# Patient Record
Sex: MALE | Race: WHITE | NOT HISPANIC OR LATINO | Employment: UNEMPLOYED | URBAN - METROPOLITAN AREA
[De-identification: names, ages, dates, MRNs, and addresses within clinical notes are randomized per-mention and may not be internally consistent; named-entity substitution may affect disease eponyms.]

---

## 2017-07-28 ENCOUNTER — OFFICE VISIT (OUTPATIENT)
Dept: FAMILY MEDICINE | Facility: CLINIC | Age: 22
End: 2017-07-28
Payer: MEDICAID

## 2017-07-28 VITALS
TEMPERATURE: 97 F | SYSTOLIC BLOOD PRESSURE: 104 MMHG | DIASTOLIC BLOOD PRESSURE: 68 MMHG | WEIGHT: 115 LBS | BODY MASS INDEX: 18.05 KG/M2 | HEIGHT: 67 IN

## 2017-07-28 DIAGNOSIS — J02.0 PHARYNGITIS DUE TO STREPTOCOCCUS SPECIES: Primary | ICD-10-CM

## 2017-07-28 LAB
CTP QC/QA: YES
S PYO RRNA THROAT QL PROBE: POSITIVE

## 2017-07-28 PROCEDURE — 99202 OFFICE O/P NEW SF 15 MIN: CPT | Mod: ,,, | Performed by: PEDIATRICS

## 2017-07-28 PROCEDURE — 87880 STREP A ASSAY W/OPTIC: CPT | Mod: ,,, | Performed by: PEDIATRICS

## 2017-07-28 RX ORDER — AMOXICILLIN 500 MG/1
500 CAPSULE ORAL 3 TIMES DAILY
Qty: 30 CAPSULE | Refills: 0 | Status: SHIPPED | OUTPATIENT
Start: 2017-07-28 | End: 2018-09-11

## 2017-07-28 NOTE — PROGRESS NOTES
Subjective:       Patient ID: Carlos Ortiz is a 21 y.o. male.    Chief Complaint: Sore Throat    HPI sore throat for 1 week.  No known fever, poor historian. No cough, vomiting, ear pain.  Review of Systems   All other systems reviewed and are negative.      Objective:      Physical Exam   Constitutional: He is oriented to person, place, and time. He appears well-developed and well-nourished. No distress.   HENT:   Head: Normocephalic.   Right Ear: External ear normal.   Left Ear: External ear normal.   Mouth/Throat: Oropharyngeal exudate present.   Eyes: Conjunctivae and EOM are normal. Pupils are equal, round, and reactive to light. Right eye exhibits no discharge. Left eye exhibits no discharge. No scleral icterus.   Neck: Normal range of motion. Neck supple. No tracheal deviation present. No thyromegaly present.   Cardiovascular: Normal rate, regular rhythm and normal heart sounds.  Exam reveals no gallop and no friction rub.    No murmur heard.  Pulmonary/Chest: Effort normal and breath sounds normal. No stridor. No respiratory distress. He has no wheezes. He has no rales.   Abdominal: Soft. Bowel sounds are normal. He exhibits no distension and no mass. There is no tenderness. There is no rebound and no guarding.   Musculoskeletal: Normal range of motion.   Lymphadenopathy:     He has cervical adenopathy.   Neurological: He is alert and oriented to person, place, and time.   Skin: Skin is warm. Capillary refill takes less than 2 seconds. He is not diaphoretic.   Nursing note and vitals reviewed.      Assessment:       1. Pharyngitis due to Streptococcus species        Plan:       Carlos was seen today for sore throat.    Diagnoses and all orders for this visit:    Pharyngitis due to Streptococcus species  -     POCT Rapid Strep A  -     amoxicillin (AMOXIL) 500 MG capsule; Take 1 capsule (500 mg total) by mouth 3 (three) times daily.

## 2018-09-11 ENCOUNTER — OFFICE VISIT (OUTPATIENT)
Dept: FAMILY MEDICINE | Facility: CLINIC | Age: 23
End: 2018-09-11
Payer: MEDICAID

## 2018-09-11 VITALS
BODY MASS INDEX: 18.36 KG/M2 | HEIGHT: 67 IN | SYSTOLIC BLOOD PRESSURE: 110 MMHG | WEIGHT: 117 LBS | OXYGEN SATURATION: 97 % | HEART RATE: 80 BPM | DIASTOLIC BLOOD PRESSURE: 60 MMHG

## 2018-09-11 DIAGNOSIS — K64.1 GRADE II HEMORRHOIDS: ICD-10-CM

## 2018-09-11 DIAGNOSIS — Z87.898 HX OF SYNCOPE: ICD-10-CM

## 2018-09-11 DIAGNOSIS — R46.89 OPPOSITIONAL DEFIANT BEHAVIOR: ICD-10-CM

## 2018-09-11 DIAGNOSIS — K22.2 ESOPHAGEAL STRICTURE: ICD-10-CM

## 2018-09-11 DIAGNOSIS — Q85.00 NF (NEUROFIBROMATOSIS): ICD-10-CM

## 2018-09-11 PROCEDURE — 99213 OFFICE O/P EST LOW 20 MIN: CPT | Mod: ,,, | Performed by: FAMILY MEDICINE

## 2018-09-11 RX ORDER — HYDROCORTISONE 25 MG/G
CREAM TOPICAL 2 TIMES DAILY
Qty: 60 G | Refills: 3 | Status: SHIPPED | OUTPATIENT
Start: 2018-09-11 | End: 2020-03-11

## 2018-09-11 NOTE — PROGRESS NOTES
Subjective:       Patient ID: Carlos Ortiz is a 23 y.o. male.    Chief Complaint: Hemorrhoids      Patient is here because of a hemorrhoid he is felt a lump in the rectal area and down went to get it checked out. He has not noticed any bleeding, not had constipation or rectal pain on on bowel movement.        Allergies and Medications:   Review of patient's allergies indicates:  No Known Allergies  Current Outpatient Medications   Medication Sig Dispense Refill    hydrocortisone 2.5 % cream Apply topically 2 (two) times daily. for 10 days 60 g 3     No current facility-administered medications for this visit.        Family History:   Family History   Problem Relation Age of Onset    Asthma Mother     Heart disease Father        Social History:   Social History     Socioeconomic History    Marital status: Single     Spouse name: Not on file    Number of children: Not on file    Years of education: Not on file    Highest education level: Not on file   Social Needs    Financial resource strain: Not on file    Food insecurity - worry: Not on file    Food insecurity - inability: Not on file    Transportation needs - medical: Not on file    Transportation needs - non-medical: Not on file   Occupational History    Not on file   Tobacco Use    Smoking status: Never Smoker    Smokeless tobacco: Never Used   Substance and Sexual Activity    Alcohol use: No    Drug use: No    Sexual activity: Not on file   Other Topics Concern    Not on file   Social History Narrative    Not on file       Review of Systems    Objective:     Vitals:    09/11/18 1000   BP: 110/60   Pulse: 80        Physical Exam   Constitutional: He appears well-developed and well-nourished. No distress.   HENT:   Head: Normocephalic and atraumatic.   Genitourinary:         Genitourinary Comments: There is a 1 cm external hemorrhoid at the 5:00 position of the rectum without bleeding or thrombosis.   Skin: He is not diaphoretic.        Assessment:       1. Hx of syncope    2. NF (neurofibromatosis)    3. Esophageal stricture    4. Oppositional defiant behavior    5. Grade II hemorrhoids        Plan:       Carlos was seen today for hemorrhoids.    Diagnoses and all orders for this visit:    Hx of syncope    NF (neurofibromatosis)  -     Ambulatory consult to Neurology  -     Ambulatory referral to Gastroenterology    Esophageal stricture  -     Ambulatory referral to Gastroenterology    Oppositional defiant behavior    Grade II hemorrhoids  -     Ambulatory referral to Gastroenterology    Other orders  -     hydrocortisone 2.5 % cream; Apply topically 2 (two) times daily. for 10 days         Follow-up in about 1 month (around 10/11/2018), or if symptoms worsen or fail to improve.

## 2018-09-11 NOTE — PATIENT INSTRUCTIONS
Metamucil or Citrucel  Increase physical activity no prolonged sitting.  Follow-up L she neurology and gastroenterology

## 2019-08-09 ENCOUNTER — HOSPITAL ENCOUNTER (EMERGENCY)
Facility: HOSPITAL | Age: 24
Discharge: HOME | End: 2019-08-09
Attending: EMERGENCY MEDICINE
Payer: MEDICAID

## 2019-08-09 VITALS
TEMPERATURE: 98 F | OXYGEN SATURATION: 99 % | DIASTOLIC BLOOD PRESSURE: 71 MMHG | HEART RATE: 95 BPM | RESPIRATION RATE: 20 BRPM | SYSTOLIC BLOOD PRESSURE: 120 MMHG

## 2019-08-09 DIAGNOSIS — N50.819 TESTICULAR PAIN: ICD-10-CM

## 2019-08-09 LAB
BILIRUB UR QL STRIP: NEGATIVE
CLARITY UR: CLEAR
COLOR UR: YELLOW
GLUCOSE UR QL STRIP: NEGATIVE
HGB UR QL STRIP: NEGATIVE
KETONES UR QL STRIP: NEGATIVE
LEUKOCYTE ESTERASE UR QL STRIP: NEGATIVE
NITRITE UR QL STRIP: NEGATIVE
PH UR STRIP: 6 [PH] (ref 5–8)
PROT UR QL STRIP: NEGATIVE
SP GR UR STRIP: 1.01 (ref 1–1.03)
URN SPEC COLLECT METH UR: NORMAL
UROBILINOGEN UR STRIP-ACNC: NEGATIVE EU/DL

## 2019-08-09 PROCEDURE — 87491 CHLMYD TRACH DNA AMP PROBE: CPT

## 2019-08-09 PROCEDURE — 81003 URINALYSIS AUTO W/O SCOPE: CPT

## 2019-08-09 PROCEDURE — 96372 THER/PROPH/DIAG INJ SC/IM: CPT | Mod: 59

## 2019-08-09 PROCEDURE — 63600175 PHARM REV CODE 636 W HCPCS: Performed by: EMERGENCY MEDICINE

## 2019-08-09 PROCEDURE — 99284 EMERGENCY DEPT VISIT MOD MDM: CPT

## 2019-08-09 RX ORDER — CEFTRIAXONE 250 MG/1
250 INJECTION, POWDER, FOR SOLUTION INTRAMUSCULAR; INTRAVENOUS
Status: COMPLETED | OUTPATIENT
Start: 2019-08-09 | End: 2019-08-09

## 2019-08-09 RX ORDER — KETOROLAC TROMETHAMINE 30 MG/ML
30 INJECTION, SOLUTION INTRAMUSCULAR; INTRAVENOUS
Status: COMPLETED | OUTPATIENT
Start: 2019-08-09 | End: 2019-08-09

## 2019-08-09 RX ORDER — DOXYCYCLINE 100 MG/1
100 CAPSULE ORAL 2 TIMES DAILY
Qty: 20 CAPSULE | Refills: 0 | Status: SHIPPED | OUTPATIENT
Start: 2019-08-09 | End: 2019-08-19

## 2019-08-09 RX ADMIN — CEFTRIAXONE SODIUM 250 MG: 250 INJECTION, POWDER, FOR SOLUTION INTRAMUSCULAR; INTRAVENOUS at 06:08

## 2019-08-09 RX ADMIN — KETOROLAC TROMETHAMINE 30 MG: 30 INJECTION, SOLUTION INTRAMUSCULAR at 06:08

## 2019-08-09 NOTE — ED PROVIDER NOTES
Encounter Date: 8/9/2019       History     Chief Complaint   Patient presents with    Testicle Pain     x 2 days     Patient reports approximately 2 day history of left scrotal any swelling and soreness.  No clearance exacerbating or relieving factors.  Pain began while at rest.  Patient has no urinary symptoms.  No dysuria or urethral discharge. No recent illness.  No fever or chills. No similar symptoms in the past.  Patient reports no recent sexual activity.        Review of patient's allergies indicates:  No Known Allergies  Past Medical History:   Diagnosis Date    NF (neurofibromatosis) 1997    Otitis media in child      Past Surgical History:   Procedure Laterality Date    FRACTURE ARM  Bilateral      Family History   Problem Relation Age of Onset    Asthma Mother     Heart disease Father      Social History     Tobacco Use    Smoking status: Never Smoker    Smokeless tobacco: Never Used   Substance Use Topics    Alcohol use: No    Drug use: No     Review of Systems   Constitutional: Negative for chills and fever.   HENT: Negative for sore throat.    Eyes: Negative for photophobia and visual disturbance.   Respiratory: Negative for shortness of breath.    Cardiovascular: Negative for chest pain.   Gastrointestinal: Negative for abdominal pain and vomiting.   Genitourinary: Negative for discharge, dysuria, genital sores and hematuria.   Musculoskeletal: Negative for joint swelling.   Skin: Negative for rash.   Neurological: Negative for weakness and headaches.   Psychiatric/Behavioral: Negative for confusion.       Physical Exam     Initial Vitals   BP Pulse Resp Temp SpO2   08/09/19 1653 08/09/19 1652 08/09/19 1652 08/09/19 1652 08/09/19 1652   116/69 101 16 97.9 °F (36.6 °C) 98 %      MAP       --                Physical Exam    Nursing note and vitals reviewed.  Constitutional: He is not diaphoretic. No distress.   HENT:   Head: Normocephalic and atraumatic.   Eyes: Conjunctivae are normal.    Neck: Normal range of motion.   Cardiovascular: Regular rhythm.   Pulmonary/Chest: Breath sounds normal.   Abdominal: Soft. There is no tenderness.   Genitourinary:   Genitourinary Comments: No inguinal lymphadenopathy or hernias.  No penile lesions. No urethral discharge. No scrotal redness.  There is some tenderness to posterior left scrotal area with no palpable masses.   Musculoskeletal: Normal range of motion.   Skin: No rash noted.   Psychiatric: He has a normal mood and affect.         ED Course   Procedures  Labs Reviewed   C. TRACHOMATIS/N. GONORRHOEAE BY AMP DNA   URINALYSIS, REFLEX TO URINE CULTURE          Imaging Results          US Scrotum And Testicles (Final result)  Result time 08/09/19 17:39:03    Final result by Sergio Gay MD (08/09/19 17:39:03)                 Impression:      1. Simple cyst or spermatocele in the right epididymal head.  2. Otherwise negative testicular ultrasound.      Electronically signed by: Sergio Gay MD  Date:    08/09/2019  Time:    17:39             Narrative:    EXAMINATION:  US SCROTUM AND TESTICLES    CLINICAL HISTORY:  Testicular pain, unspecified N50.819    FINDINGS:  Grayscale, color and spectral Doppler analysis of the testicles and scrotum was performed. No prior studies for comparison.    Both testicles are normal in size and fairly homogeneous in echotexture, with no discrete intratesticular masses demonstrated. There is normal, symmetric color and spectral Doppler vascular flow documented to both testicles.    The epididymides are normal in size and echotexture, with normal color Doppler vascular flow.  Circumscribed anechoic simple cyst or spermatocele in the right epididymal head measures 4 mm, with increased through transmission of sound.  There is no hydrocele or varicocele demonstrated. There is no scrotal wall skin thickening or subcutaneous fluid collection.                                 Medical Decision Making:   History:   Old Medical  Records: I decided to obtain old medical records.  Clinical Tests:   Lab Tests: Reviewed  Radiological Study: Reviewed  ED Management:  Patient presents with some left posterior scrotal pain.  Very unclear etiology.  Ultrasound with no definite epididymitis.  No current torsion.  Clinically symptoms are more consistent with epididymitis.  Urinalysis with no significant white blood cells.  Will check gonorrhea and chlamydia.  Will treat for possible sexually transmitted epididymitis with Rocephin and doxycycline.  Discussed with Dr. Tinajero.  Discussed possibility of intermittent torsion.  He states he will follow up closely.  Will begin treatment for presumptive epididymitis.  Strict return precautions given.  Currently patient is essentially pain-free.                      Clinical Impression:       ICD-10-CM ICD-9-CM   1. Testicular pain N50.819 608.9                                Ramesh Escoto MD  08/09/19 1908

## 2019-08-13 LAB
CHLAMYDIA, AMPLIFIED DNA: NEGATIVE
N GONORRHOEAE, AMPLIFIED DNA: NEGATIVE

## 2019-08-17 ENCOUNTER — HOSPITAL ENCOUNTER (EMERGENCY)
Facility: HOSPITAL | Age: 24
Discharge: HOME | End: 2019-08-17
Attending: EMERGENCY MEDICINE
Payer: MEDICAID

## 2019-08-17 VITALS
DIASTOLIC BLOOD PRESSURE: 57 MMHG | HEIGHT: 69 IN | TEMPERATURE: 98 F | WEIGHT: 120 LBS | HEART RATE: 79 BPM | OXYGEN SATURATION: 100 % | RESPIRATION RATE: 76 BRPM | BODY MASS INDEX: 17.77 KG/M2 | SYSTOLIC BLOOD PRESSURE: 117 MMHG

## 2019-08-17 DIAGNOSIS — R11.10 VOMITING, INTRACTABILITY OF VOMITING NOT SPECIFIED, PRESENCE OF NAUSEA NOT SPECIFIED, UNSPECIFIED VOMITING TYPE: Primary | ICD-10-CM

## 2019-08-17 DIAGNOSIS — R55 SYNCOPE: ICD-10-CM

## 2019-08-17 DIAGNOSIS — R05.9 COUGH: ICD-10-CM

## 2019-08-17 LAB
ALBUMIN SERPL BCP-MCNC: 4.1 G/DL (ref 3.5–5.2)
ALP SERPL-CCNC: 58 U/L (ref 55–135)
ALT SERPL W/O P-5'-P-CCNC: 20 U/L (ref 10–44)
AMPHET+METHAMPHET UR QL: NEGATIVE
AMYLASE SERPL-CCNC: 49 U/L (ref 20–110)
ANION GAP SERPL CALC-SCNC: 9 MMOL/L (ref 8–16)
AST SERPL-CCNC: 17 U/L (ref 10–40)
BARBITURATES UR QL SCN>200 NG/ML: NORMAL
BASOPHILS # BLD AUTO: 0.05 K/UL (ref 0–0.2)
BASOPHILS NFR BLD: 0.7 % (ref 0–1.9)
BENZODIAZ UR QL SCN>200 NG/ML: NEGATIVE
BILIRUB SERPL-MCNC: 0.7 MG/DL (ref 0.1–1)
BILIRUB UR QL STRIP: NEGATIVE
BUN SERPL-MCNC: 13 MG/DL (ref 6–20)
BZE UR QL SCN: NEGATIVE
CALCIUM SERPL-MCNC: 9.7 MG/DL (ref 8.7–10.5)
CANNABINOIDS UR QL SCN: NEGATIVE
CHLORIDE SERPL-SCNC: 106 MMOL/L (ref 95–110)
CK SERPL-CCNC: 30 U/L (ref 20–200)
CLARITY UR: CLEAR
CO2 SERPL-SCNC: 27 MMOL/L (ref 23–29)
COLOR UR: YELLOW
CREAT SERPL-MCNC: 0.9 MG/DL (ref 0.5–1.4)
CREAT UR-MCNC: 91 MG/DL (ref 23–375)
D DIMER PPP IA.FEU-MCNC: <0.27 UG/ML FEU
DIFFERENTIAL METHOD: ABNORMAL
EOSINOPHIL # BLD AUTO: 0.1 K/UL (ref 0–0.5)
EOSINOPHIL NFR BLD: 1.2 % (ref 0–8)
ERYTHROCYTE [DISTWIDTH] IN BLOOD BY AUTOMATED COUNT: 12.6 % (ref 11.5–14.5)
EST. GFR  (AFRICAN AMERICAN): >60 ML/MIN/1.73 M^2
EST. GFR  (NON AFRICAN AMERICAN): >60 ML/MIN/1.73 M^2
GLUCOSE SERPL-MCNC: 98 MG/DL (ref 70–110)
GLUCOSE UR QL STRIP: NEGATIVE
HCT VFR BLD AUTO: 46.2 % (ref 40–54)
HGB BLD-MCNC: 15.2 G/DL (ref 14–18)
HGB UR QL STRIP: NEGATIVE
IMM GRANULOCYTES # BLD AUTO: 0.02 K/UL (ref 0–0.04)
IMM GRANULOCYTES NFR BLD AUTO: 0.3 % (ref 0–0.5)
KETONES UR QL STRIP: NEGATIVE
LEUKOCYTE ESTERASE UR QL STRIP: NEGATIVE
LIPASE SERPL-CCNC: 25 U/L (ref 4–60)
LYMPHOCYTES # BLD AUTO: 2.4 K/UL (ref 1–4.8)
LYMPHOCYTES NFR BLD: 32.3 % (ref 18–48)
MCH RBC QN AUTO: 26.6 PG (ref 27–31)
MCHC RBC AUTO-ENTMCNC: 32.9 G/DL (ref 32–36)
MCV RBC AUTO: 81 FL (ref 82–98)
MONOCYTES # BLD AUTO: 0.9 K/UL (ref 0.3–1)
MONOCYTES NFR BLD: 12.9 % (ref 4–15)
NEUTROPHILS # BLD AUTO: 3.8 K/UL (ref 1.8–7.7)
NEUTROPHILS NFR BLD: 52.6 % (ref 38–73)
NITRITE UR QL STRIP: NEGATIVE
NRBC BLD-RTO: 0 /100 WBC
OPIATES UR QL SCN: NEGATIVE
PCP UR QL SCN>25 NG/ML: NEGATIVE
PH UR STRIP: 6 [PH] (ref 5–8)
PLATELET # BLD AUTO: 218 K/UL (ref 150–350)
PMV BLD AUTO: 10.8 FL (ref 9.2–12.9)
POTASSIUM SERPL-SCNC: 3.7 MMOL/L (ref 3.5–5.1)
PROT SERPL-MCNC: 6.9 G/DL (ref 6–8.4)
PROT UR QL STRIP: NEGATIVE
RBC # BLD AUTO: 5.71 M/UL (ref 4.6–6.2)
SODIUM SERPL-SCNC: 142 MMOL/L (ref 136–145)
SP GR UR STRIP: 1 (ref 1–1.03)
TOXICOLOGY INFORMATION: NORMAL
TROPONIN I SERPL DL<=0.01 NG/ML-MCNC: <0.03 NG/ML (ref 0.02–0.04)
URN SPEC COLLECT METH UR: NORMAL
UROBILINOGEN UR STRIP-ACNC: NEGATIVE EU/DL
WBC # BLD AUTO: 7.27 K/UL (ref 3.9–12.7)

## 2019-08-17 PROCEDURE — 80053 COMPREHEN METABOLIC PANEL: CPT

## 2019-08-17 PROCEDURE — 82550 ASSAY OF CK (CPK): CPT

## 2019-08-17 PROCEDURE — 63600175 PHARM REV CODE 636 W HCPCS: Performed by: NURSE PRACTITIONER

## 2019-08-17 PROCEDURE — 93005 ELECTROCARDIOGRAM TRACING: CPT

## 2019-08-17 PROCEDURE — 85379 FIBRIN DEGRADATION QUANT: CPT

## 2019-08-17 PROCEDURE — 63600175 PHARM REV CODE 636 W HCPCS: Performed by: EMERGENCY MEDICINE

## 2019-08-17 PROCEDURE — 82150 ASSAY OF AMYLASE: CPT

## 2019-08-17 PROCEDURE — 81003 URINALYSIS AUTO W/O SCOPE: CPT

## 2019-08-17 PROCEDURE — 99285 EMERGENCY DEPT VISIT HI MDM: CPT | Mod: 25

## 2019-08-17 PROCEDURE — 96361 HYDRATE IV INFUSION ADD-ON: CPT

## 2019-08-17 PROCEDURE — 96374 THER/PROPH/DIAG INJ IV PUSH: CPT

## 2019-08-17 PROCEDURE — 83690 ASSAY OF LIPASE: CPT

## 2019-08-17 PROCEDURE — 87086 URINE CULTURE/COLONY COUNT: CPT

## 2019-08-17 PROCEDURE — 36415 COLL VENOUS BLD VENIPUNCTURE: CPT

## 2019-08-17 PROCEDURE — 85025 COMPLETE CBC W/AUTO DIFF WBC: CPT

## 2019-08-17 PROCEDURE — 84484 ASSAY OF TROPONIN QUANT: CPT

## 2019-08-17 PROCEDURE — 80307 DRUG TEST PRSMV CHEM ANLYZR: CPT

## 2019-08-17 RX ORDER — ONDANSETRON 4 MG/1
4 TABLET, ORALLY DISINTEGRATING ORAL EVERY 6 HOURS PRN
Qty: 12 TABLET | Refills: 0 | Status: SHIPPED | OUTPATIENT
Start: 2019-08-17 | End: 2020-03-11

## 2019-08-17 RX ORDER — ONDANSETRON 2 MG/ML
4 INJECTION INTRAMUSCULAR; INTRAVENOUS
Status: COMPLETED | OUTPATIENT
Start: 2019-08-17 | End: 2019-08-17

## 2019-08-17 RX ADMIN — SODIUM CHLORIDE 1000 ML: 0.9 INJECTION, SOLUTION INTRAVENOUS at 09:08

## 2019-08-17 RX ADMIN — ONDANSETRON 4 MG: 2 INJECTION INTRAMUSCULAR; INTRAVENOUS at 07:08

## 2019-08-17 RX ADMIN — SODIUM CHLORIDE 1000 ML: 0.9 INJECTION, SOLUTION INTRAVENOUS at 07:08

## 2019-08-17 NOTE — ED PROVIDER NOTES
"Encounter Date: 8/17/2019       History     Chief Complaint   Patient presents with    Emesis     recently here, states "blacking out"     23 year old male presents complaining of nausea, vomiting and weakness. Symptoms onset 1 week ago after he started taking Doxycycline. He was seen here at that time for testicular pain. He denies fever or chills. He does endorse weakness and near syncope associated with vomiting. No recent sick contacts or travel. Symptoms are worse after taking Doxycycline. No alleviating factors. He also endorses nonproductive cough and mild wheezing.        Review of patient's allergies indicates:  No Known Allergies  Past Medical History:   Diagnosis Date    NF (neurofibromatosis) 1997    Otitis media in child      Past Surgical History:   Procedure Laterality Date    FRACTURE ARM  Bilateral      Family History   Problem Relation Age of Onset    Asthma Mother     Heart disease Father      Social History     Tobacco Use    Smoking status: Never Smoker    Smokeless tobacco: Never Used   Substance Use Topics    Alcohol use: No    Drug use: No     Review of Systems   Constitutional: Negative.    HENT: Negative.    Eyes: Negative.    Respiratory: Negative.    Cardiovascular: Negative.    Gastrointestinal: Positive for nausea and vomiting. Negative for abdominal pain.   Musculoskeletal: Negative.    Skin: Negative.    Psychiatric/Behavioral: Negative.        Physical Exam     Initial Vitals [08/17/19 1749]   BP Pulse Resp Temp SpO2   (!) 127/58 91 16 97.8 °F (36.6 °C) 98 %      MAP       --         Physical Exam    Nursing note and vitals reviewed.  Constitutional: He appears well-developed and well-nourished. He is not diaphoretic. No distress.   HENT:   Head: Normocephalic.   Right Ear: External ear normal.   Left Ear: External ear normal.   Mouth/Throat: Oropharynx is clear and moist. No oropharyngeal exudate.   Eyes: Conjunctivae are normal. Pupils are equal, round, and reactive to " light.   Neck: Normal range of motion. Neck supple.   Cardiovascular: Normal rate, regular rhythm, normal heart sounds and intact distal pulses. Exam reveals no gallop and no friction rub.    No murmur heard.  Pulmonary/Chest: No respiratory distress. He has wheezes. He has no rhonchi. He has no rales. He exhibits no tenderness.   Abdominal: Soft. Bowel sounds are normal. He exhibits no distension and no mass. There is no tenderness. There is no rebound and no guarding.   Musculoskeletal: Normal range of motion. He exhibits no edema or tenderness.   Lymphadenopathy:     He has no cervical adenopathy.   Neurological: He is alert and oriented to person, place, and time. He has normal strength. No cranial nerve deficit. GCS score is 15. GCS eye subscore is 4. GCS verbal subscore is 5. GCS motor subscore is 6.   Skin: Skin is warm and dry. Capillary refill takes less than 2 seconds. No rash and no abscess noted. No erythema. No pallor.   Psychiatric: He has a normal mood and affect. Thought content normal.         ED Course   Procedures  Labs Reviewed   CBC W/ AUTO DIFFERENTIAL   COMPREHENSIVE METABOLIC PANEL   LIPASE   URINALYSIS, REFLEX TO URINE CULTURE   AMYLASE          Imaging Results    None            Additional MDM:   Abdominal Pain: Medication(s) given for nausea: Zofran. Response to nausea medication: resolved.   Re-evaluation of symptoms: Improved. Disposition: Discharged. Vitals stable. He is does not appear to be acutely ill or toxic in appearance. Labs stable. Abdominal exam is benign. IV fluids given. IV zofran given. CT head negative. He is tolerating po at time of discharge. Rx: Zofran. Recommend D/C doxycycline.Recommend primary care follow up. Return precautions discussed.                    Clinical Impression:   Nausea and Vomiting                             Karan Feng NP  08/17/19 6541

## 2019-08-20 LAB
BACTERIA UR CULT: NO GROWTH
BARBITURATES UR QL SCN: NEGATIVE NG/ML
LABORATORY COMMENT REPORT: NORMAL

## 2020-03-11 ENCOUNTER — OFFICE VISIT (OUTPATIENT)
Dept: FAMILY MEDICINE | Facility: CLINIC | Age: 25
End: 2020-03-11
Payer: MEDICAID

## 2020-03-11 VITALS
WEIGHT: 128.88 LBS | TEMPERATURE: 98 F | OXYGEN SATURATION: 98 % | HEIGHT: 69 IN | SYSTOLIC BLOOD PRESSURE: 112 MMHG | DIASTOLIC BLOOD PRESSURE: 80 MMHG | BODY MASS INDEX: 19.09 KG/M2 | HEART RATE: 84 BPM

## 2020-03-11 DIAGNOSIS — L03.011 PARONYCHIA OF THUMB, RIGHT: ICD-10-CM

## 2020-03-11 DIAGNOSIS — Z00.00 PREVENTATIVE HEALTH CARE: Primary | ICD-10-CM

## 2020-03-11 PROCEDURE — 99213 OFFICE O/P EST LOW 20 MIN: CPT | Mod: S$PBB,,, | Performed by: FAMILY MEDICINE

## 2020-03-11 PROCEDURE — 99213 PR OFFICE/OUTPT VISIT, EST, LEVL III, 20-29 MIN: ICD-10-PCS | Mod: S$PBB,,, | Performed by: FAMILY MEDICINE

## 2020-03-11 PROCEDURE — 99213 OFFICE O/P EST LOW 20 MIN: CPT | Performed by: FAMILY MEDICINE

## 2020-03-11 RX ORDER — AMOXICILLIN AND CLAVULANATE POTASSIUM 875; 125 MG/1; MG/1
1 TABLET, FILM COATED ORAL 2 TIMES DAILY
Qty: 20 TABLET | Refills: 0 | Status: SHIPPED | OUTPATIENT
Start: 2020-03-11 | End: 2022-09-01

## 2020-03-11 NOTE — PATIENT INSTRUCTIONS
Lactobacillus Oral formulations  What is this medicine?  LACTOBACILLUS (keny GRAY uhs) is a supplement. It is used to help the normal balance of bacteria in the colon. This may treat or prevent diarrhea caused by an infection or by antibiotics. The FDA has not approved this supplement for any medical use.  This supplement may be used for other purposes; ask your health care provider or pharmacist if you have questions.  How should I use this medicine?  Take this medicine by mouth with a small amount of milk, fruit juice, or water. Follow the directions on the package labeling, or take as directed by your health care professional. This medicine can be taken with cereal or other food. Do not take this medicine more often than directed.  Contact your pediatrician regarding the use of this medicine in children. Special care may be needed. This medicine is not recommended for children under 3 years old unless prescribed by a doctor.  What side effects may I notice from receiving this medicine?  Side effects that you should report to your doctor or health care professional as soon as possible:  · allergic reactions like skin rash, itching or hives, swelling of the face, lips, or tongue  · breathing problems  · severe nausea, vomiting  · unusually weak or tired  Side effects that usually do not require medical attention (report to your doctor or health care professional if they continue or are bothersome):  · hiccups  · stomach gas  What may interact with this medicine?  Interactions are not expected.  What if I miss a dose?  If you miss a dose, take it as soon as you can. If it is almost time for your next dose, take only that dose. Do not take double or extra doses.  Where should I keep my medicine?  Keep out of the reach of children.  Store in the refrigerator or as directed on the package label. Do not freeze. Throw away any unused medicine after the expiration date.  What should I tell my health care provider  before I take this medicine?  They need to know if you have any of these conditions:  · chronic disease  · immune system problems  · prosthetic heart valve or valvular heart disease  · an unusual or allergic reaction to Lactobacillus, any medicines, lactose or milk, other foods, dyes, or preservatives  · pregnant or trying to get pregnant  · breast-feeding  What should I watch for while using this medicine?  See your doctor if your symptoms do not get better or if they get worse. Do not take this supplement for more than 2 days or if you have a fever unless your doctor tells you to.  If you have allergies to milk or you are sensitive to lactose, do not use this supplement.  NOTE:This sheet is a summary. It may not cover all possible information. If you have questions about this medicine, talk to your doctor, pharmacist, or health care provider. Copyright© 2017 [employer]

## 2020-03-11 NOTE — PROGRESS NOTES
Subjective:       Patient ID: Carlos Ortiz is a 24 y.o. male.    Chief Complaint: Infection (right thumb red and swollen x 3 days no fever )      Started on Monday with infection in the right thumb.  He does trim the nails close and bites the nails and has developed an ingrown cuticle on that side.    Infection         Allergies and Medications:   Review of patient's allergies indicates:  No Known Allergies  Current Outpatient Medications   Medication Sig Dispense Refill    amoxicillin-clavulanate 875-125mg (AUGMENTIN) 875-125 mg per tablet Take 1 tablet by mouth 2 (two) times daily. 20 tablet 0     No current facility-administered medications for this visit.        Family History:   Family History   Problem Relation Age of Onset    Asthma Mother     Heart disease Father        Social History:   Social History     Socioeconomic History    Marital status: Single     Spouse name: Not on file    Number of children: Not on file    Years of education: Not on file    Highest education level: Not on file   Occupational History    Not on file   Social Needs    Financial resource strain: Not on file    Food insecurity:     Worry: Not on file     Inability: Not on file    Transportation needs:     Medical: Not on file     Non-medical: Not on file   Tobacco Use    Smoking status: Never Smoker    Smokeless tobacco: Never Used   Substance and Sexual Activity    Alcohol use: No    Drug use: No    Sexual activity: Not on file   Lifestyle    Physical activity:     Days per week: Not on file     Minutes per session: Not on file    Stress: Not at all   Relationships    Social connections:     Talks on phone: Not on file     Gets together: Not on file     Attends Worship service: Not on file     Active member of club or organization: Not on file     Attends meetings of clubs or organizations: Not on file     Relationship status: Not on file   Other Topics Concern    Not on file   Social History Narrative     Not on file       Review of Systems    Objective:     Vitals:    03/11/20 1026   BP: 112/80   Pulse: 84   Temp: 98.3 °F (36.8 °C)        Physical Exam   Musculoskeletal:        Hands:      Assessment:       1. Paronychia of thumb, right        Plan:       Carlos was seen today for infection.    Diagnoses and all orders for this visit:    Paronychia of thumb, right  -     amoxicillin-clavulanate 875-125mg (AUGMENTIN) 875-125 mg per tablet; Take 1 tablet by mouth 2 (two) times daily.         Follow up in about 1 week (around 3/18/2020), or if symptoms worsen or fail to improve.

## 2020-03-16 ENCOUNTER — OFFICE VISIT (OUTPATIENT)
Dept: FAMILY MEDICINE | Facility: CLINIC | Age: 25
End: 2020-03-16
Payer: MEDICAID

## 2020-03-16 VITALS
HEART RATE: 88 BPM | TEMPERATURE: 98 F | HEIGHT: 69 IN | WEIGHT: 128.31 LBS | SYSTOLIC BLOOD PRESSURE: 112 MMHG | OXYGEN SATURATION: 98 % | BODY MASS INDEX: 19 KG/M2 | DIASTOLIC BLOOD PRESSURE: 62 MMHG

## 2020-03-16 DIAGNOSIS — L03.011 PARONYCHIA OF THUMB, RIGHT: Primary | ICD-10-CM

## 2020-03-16 PROCEDURE — 99213 OFFICE O/P EST LOW 20 MIN: CPT | Mod: 25 | Performed by: FAMILY MEDICINE

## 2020-03-16 PROCEDURE — 10060 I&D ABSCESS SIMPLE/SINGLE: CPT | Mod: PBBFAC | Performed by: FAMILY MEDICINE

## 2020-03-16 PROCEDURE — 87070 CULTURE OTHR SPECIMN AEROBIC: CPT

## 2020-03-16 PROCEDURE — 10060 INCISION & DRAINAGE: ICD-10-PCS | Mod: S$PBB,,, | Performed by: FAMILY MEDICINE

## 2020-03-16 RX ORDER — HYDROCODONE BITARTRATE AND ACETAMINOPHEN 5; 325 MG/1; MG/1
1 TABLET ORAL EVERY 6 HOURS PRN
Qty: 28 TABLET | Refills: 0 | Status: SHIPPED | OUTPATIENT
Start: 2020-03-16 | End: 2020-03-23

## 2020-03-16 RX ORDER — LEVOFLOXACIN 500 MG/1
500 TABLET, FILM COATED ORAL DAILY
Qty: 10 TABLET | Refills: 0 | Status: SHIPPED | OUTPATIENT
Start: 2020-03-16 | End: 2020-03-26

## 2020-03-16 NOTE — PROGRESS NOTES
Subjective:       Patient ID: Carlos Ortiz is a 24 y.o. male.    Chief Complaint: Consult (issues with thumb not improving)      Patient is here for follow-up on infected thumb he had a low-grade paronychial infection on last visit Wednesday he has taken Augmentin every day since then and the infection has gotten worse has developed a large abscess and under the paronychial area.  No fever.  He has had no swollen proximal olecranon on or axillary nodes.      Allergies and Medications:   Review of patient's allergies indicates:  No Known Allergies  Current Outpatient Medications   Medication Sig Dispense Refill    amoxicillin-clavulanate 875-125mg (AUGMENTIN) 875-125 mg per tablet Take 1 tablet by mouth 2 (two) times daily. 20 tablet 0     No current facility-administered medications for this visit.        Family History:   Family History   Problem Relation Age of Onset    Asthma Mother     Heart disease Father        Social History:   Social History     Socioeconomic History    Marital status: Single     Spouse name: Not on file    Number of children: Not on file    Years of education: Not on file    Highest education level: Not on file   Occupational History    Not on file   Social Needs    Financial resource strain: Not on file    Food insecurity:     Worry: Not on file     Inability: Not on file    Transportation needs:     Medical: Not on file     Non-medical: Not on file   Tobacco Use    Smoking status: Never Smoker    Smokeless tobacco: Never Used   Substance and Sexual Activity    Alcohol use: No    Drug use: No    Sexual activity: Not on file   Lifestyle    Physical activity:     Days per week: Not on file     Minutes per session: Not on file    Stress: Not at all   Relationships    Social connections:     Talks on phone: Not on file     Gets together: Not on file     Attends Judaism service: Not on file     Active member of club or organization: Not on file     Attends meetings of  clubs or organizations: Not on file     Relationship status: Not on file   Other Topics Concern    Not on file   Social History Narrative    Not on file       Review of Systems    Objective:     Vitals:    03/16/20 1505   BP: 112/62   Pulse: 88   Temp: 98.1 °F (36.7 °C)        Physical Exam   Constitutional: He appears well-developed and well-nourished.   HENT:   Head: Normocephalic.   Musculoskeletal:        Hands:  Nursing note and vitals reviewed.      Assessment:       1. Paronychia of thumb, right        Plan:       Carlos was seen today for consult.    Diagnoses and all orders for this visit:    Paronychia of thumb, right         Follow up in about 2 weeks (around 3/30/2020).

## 2020-03-16 NOTE — PROCEDURES
"Incision & Drainage  Date/Time: 3/16/2020 3:00 PM  Performed by: Lamont Bains MD  Authorized by: Lamont Bains MD     Time out: Immediately prior to procedure a "time out" was called to verify the correct patient, procedure, equipment, support staff and site/side marked as required.    Consent Done?:  Yes (Written)    Type:  Abscess  Body area:  Upper extremity  Location details:  Right hand  Anesthesia:  Digital block  Local anesthetic: lidocaine 2% without epinephrine  Scalpel size:  11  Incision type:  Single straight  Complexity:  Simple  Drainage:  Pus, bloody, purulent and serous  Drainage amount:  Copious  Wound treatment:  Wound left open    Dressed with a 2 x 2 Neosporin ointment and toe gauze.      "

## 2020-03-20 LAB — BACTERIA SPEC AEROBE CULT: ABNORMAL

## 2020-04-02 ENCOUNTER — OFFICE VISIT (OUTPATIENT)
Dept: FAMILY MEDICINE | Facility: CLINIC | Age: 25
End: 2020-04-02
Payer: MEDICAID

## 2020-04-02 DIAGNOSIS — L03.011 PARONYCHIA OF THUMB, RIGHT: Primary | ICD-10-CM

## 2020-04-02 DIAGNOSIS — Z23 IMMUNIZATION DUE: ICD-10-CM

## 2020-04-02 PROCEDURE — 99213 OFFICE O/P EST LOW 20 MIN: CPT | Mod: 95,,, | Performed by: FAMILY MEDICINE

## 2020-04-02 PROCEDURE — 99213 PR OFFICE/OUTPT VISIT, EST, LEVL III, 20-29 MIN: ICD-10-PCS | Mod: 95,,, | Performed by: FAMILY MEDICINE

## 2020-04-02 NOTE — PROGRESS NOTES
Subjective:      M*Modal was used in creation of this note and may include some Misspelling and misinterpretations.    The chief complaint leading to consultation is:  Follow-up paronychia L abscess  The patient location is:  Home  Visit type: Virtual visit with synchronous audio and video    Patient is here infection.  Patient was seen here with the paronychial infection subsequently developed an abscess and was drained 2 weeks ago is doing a virtual visit for follow-up.  He reports that the wound healed up well is still pink and slightly tender but there is no numbness or pain as it was before patient did complete the course of antibiotics.                 Past Surgical History:   Procedure Laterality Date    FRACTURE ARM  Bilateral      Past Medical History:   Diagnosis Date    NF (neurofibromatosis) 1997    Otitis media in child      Family History   Problem Relation Age of Onset    Asthma Mother     Heart disease Father         Social History:   Marital Status: Single  Alcohol History:  reports that he does not drink alcohol.  Tobacco History:  reports that he has never smoked. He has never used smokeless tobacco.  Drug History:  reports that he does not use drugs.    Review of patient's allergies indicates:  No Known Allergies    Current Outpatient Medications   Medication Sig Dispense Refill    amoxicillin-clavulanate 875-125mg (AUGMENTIN) 875-125 mg per tablet Take 1 tablet by mouth 2 (two) times daily. 20 tablet 0     No current facility-administered medications for this visit.        Review of Systems      Objective:        Physical Exam:   Physical Exam   Musculoskeletal:        Hands:           Assessment:       1. Paronychia of thumb, right    2. Immunization due tetanus and influenza vaccine be deferred into the fall because of the COVID-19 situation.     Plan:   Paronychia of thumb, right    Immunization due      Follow up in about 3 months (around 7/2/2020) for annual.    Total time spent  with patient: 15'    Each patient to whom he or she provides medical services by telemedicine is:  (1) informed of the relationship between the physician and patient and the respective role of any other health care provider with respect to management of the patient; and (2) notified that he or she may decline to receive medical services by telemedicine and may withdraw from such care at any time.    This note was created using [employer] voice recognition software that occasionally misinterprets phrases or words.

## 2020-04-06 ENCOUNTER — TELEPHONE (OUTPATIENT)
Dept: FAMILY MEDICINE | Facility: CLINIC | Age: 25
End: 2020-04-06

## 2021-06-01 ENCOUNTER — TELEPHONE (OUTPATIENT)
Dept: FAMILY MEDICINE | Facility: CLINIC | Age: 26
End: 2021-06-01

## 2022-07-12 ENCOUNTER — OFFICE VISIT (OUTPATIENT)
Dept: FAMILY MEDICINE | Facility: CLINIC | Age: 27
End: 2022-07-12
Payer: MEDICAID

## 2022-07-12 VITALS
DIASTOLIC BLOOD PRESSURE: 74 MMHG | OXYGEN SATURATION: 99 % | SYSTOLIC BLOOD PRESSURE: 108 MMHG | HEART RATE: 64 BPM | HEIGHT: 69 IN | BODY MASS INDEX: 21.77 KG/M2 | WEIGHT: 147 LBS | TEMPERATURE: 98 F

## 2022-07-12 DIAGNOSIS — Q85.00: ICD-10-CM

## 2022-07-12 DIAGNOSIS — R55 RECURRENT SYNCOPE: Primary | ICD-10-CM

## 2022-07-12 DIAGNOSIS — R00.0 TACHYCARDIA: ICD-10-CM

## 2022-07-12 PROCEDURE — 99214 OFFICE O/P EST MOD 30 MIN: CPT | Performed by: NURSE PRACTITIONER

## 2022-07-12 PROCEDURE — 3074F SYST BP LT 130 MM HG: CPT | Mod: CPTII,,, | Performed by: NURSE PRACTITIONER

## 2022-07-12 PROCEDURE — 99214 OFFICE O/P EST MOD 30 MIN: CPT | Mod: S$PBB,,, | Performed by: NURSE PRACTITIONER

## 2022-07-12 PROCEDURE — 99214 PR OFFICE/OUTPT VISIT, EST, LEVL IV, 30-39 MIN: ICD-10-PCS | Mod: S$PBB,,, | Performed by: NURSE PRACTITIONER

## 2022-07-12 PROCEDURE — 3074F PR MOST RECENT SYSTOLIC BLOOD PRESSURE < 130 MM HG: ICD-10-PCS | Mod: CPTII,,, | Performed by: NURSE PRACTITIONER

## 2022-07-12 PROCEDURE — 3008F PR BODY MASS INDEX (BMI) DOCUMENTED: ICD-10-PCS | Mod: CPTII,,, | Performed by: NURSE PRACTITIONER

## 2022-07-12 PROCEDURE — 1160F RVW MEDS BY RX/DR IN RCRD: CPT | Mod: CPTII,,, | Performed by: NURSE PRACTITIONER

## 2022-07-12 PROCEDURE — 1159F PR MEDICATION LIST DOCUMENTED IN MEDICAL RECORD: ICD-10-PCS | Mod: CPTII,,, | Performed by: NURSE PRACTITIONER

## 2022-07-12 PROCEDURE — 3078F PR MOST RECENT DIASTOLIC BLOOD PRESSURE < 80 MM HG: ICD-10-PCS | Mod: CPTII,,, | Performed by: NURSE PRACTITIONER

## 2022-07-12 PROCEDURE — 1159F MED LIST DOCD IN RCRD: CPT | Mod: CPTII,,, | Performed by: NURSE PRACTITIONER

## 2022-07-12 PROCEDURE — 1160F PR REVIEW ALL MEDS BY PRESCRIBER/CLIN PHARMACIST DOCUMENTED: ICD-10-PCS | Mod: CPTII,,, | Performed by: NURSE PRACTITIONER

## 2022-07-12 PROCEDURE — 3008F BODY MASS INDEX DOCD: CPT | Mod: CPTII,,, | Performed by: NURSE PRACTITIONER

## 2022-07-12 PROCEDURE — 3078F DIAST BP <80 MM HG: CPT | Mod: CPTII,,, | Performed by: NURSE PRACTITIONER

## 2022-07-12 NOTE — PROGRESS NOTES
SUBJECTIVE:      Patient ID: Carlos Ortiz is a 26 y.o. male.    Chief Complaint: Loss of Consciousness (Patient states that he has a history of Syncope but it hasn't happened in 4 years until 7/7/22. Patient states that he was at work at [employer] and [employer] like he was going to pass out so he slowly ease himself down to the floor. Patient states that he was out for approximately 5 min per his co-workers. Patient hasn't had another episode since 7/7/22.)    26-year-old male presents to the clinic with complaints of syncope.  PMH significant for neurofibromatosis in 1997 and previous syncopal episodes. He is no longer followed by neurology, stopped at the age of 21.  He works at [employer].  The syncopal episode occurred Thursday at work while moving boxes. He felt his heart race prior to passing out. The event was witnessed by others. Denies injury or trauma.  This was the first syncopal episode in 4 years.  Denies hx of anemia and diabetes. No seizure activity. There have been no sudden or early deaths in the family. His dad does have heart disease with a hx of MI x4.  Denies drug use or recent illnesses.     Loss of Consciousness  This is a recurrent problem. The current episode started in the past 7 days. Episode frequency: once. The problem has been resolved. He lost consciousness for a period of 1 to 5 minutes. Exacerbated by: Lifting boxes. Associated symptoms include light-headedness and palpitations. Pertinent negatives include no abdominal pain, auditory change, aura, back pain, bladder incontinence, bowel incontinence, chest pain, clumsiness, confusion, diaphoresis, dizziness, fever, focal sensory loss, focal weakness, headaches, malaise/fatigue, nausea, slurred speech, vertigo, visual change, vomiting or weakness. He has tried nothing for the symptoms. There is no history of arrhythmia, a clotting disorder, DM or a sudden death in family.       Family History   Problem Relation Age of Onset     Asthma Mother     Heart disease Father       Social History     Socioeconomic History    Marital status: Single   Tobacco Use    Smoking status: Never Smoker    Smokeless tobacco: Never Used   Substance and Sexual Activity    Alcohol use: No    Drug use: No     Current Outpatient Medications   Medication Sig Dispense Refill    amoxicillin-clavulanate 875-125mg (AUGMENTIN) 875-125 mg per tablet Take 1 tablet by mouth 2 (two) times daily. (Patient not taking: Reported on 7/12/2022) 20 tablet 0     No current facility-administered medications for this visit.     Review of patient's allergies indicates:  No Known Allergies   Past Medical History:   Diagnosis Date    NF (neurofibromatosis) 1997    Otitis media in child      Past Surgical History:   Procedure Laterality Date    FRACTURE ARM  Bilateral        Review of Systems   Constitutional: Negative for activity change, appetite change, chills, diaphoresis, fatigue, fever, malaise/fatigue and unexpected weight change.   HENT: Negative for congestion, ear pain, sinus pressure, sore throat, trouble swallowing and voice change.    Eyes: Negative for pain, discharge and visual disturbance.   Respiratory: Negative for cough, chest tightness, shortness of breath and wheezing.    Cardiovascular: Positive for palpitations and syncope. Negative for chest pain.   Gastrointestinal: Negative for abdominal pain, bowel incontinence, constipation, diarrhea, nausea and vomiting.   Genitourinary: Negative for bladder incontinence, difficulty urinating, flank pain, frequency and urgency.   Musculoskeletal: Negative for back pain and joint swelling.   Skin: Negative for color change and rash.   Neurological: Positive for syncope and light-headedness. Negative for dizziness, vertigo, tremors, focal weakness, seizures, facial asymmetry, speech difficulty, weakness, numbness and headaches.   Hematological: Negative for adenopathy.   Psychiatric/Behavioral: Negative for confusion,  "dysphoric mood and sleep disturbance. The patient is not nervous/anxious.       OBJECTIVE:      Vitals:    07/12/22 0912   BP: 108/74   Pulse: 64   Temp: 97.9 °F (36.6 °C)   TempSrc: Oral   SpO2: 99%   Weight: 66.7 kg (147 lb)   Height: 5' 9" (1.753 m)     Physical Exam  Vitals and nursing note reviewed.   Constitutional:       General: He is awake. He is not in acute distress.     Appearance: Normal appearance. He is well-developed and normal weight. He is not ill-appearing, toxic-appearing or diaphoretic.   HENT:      Head: Normocephalic and atraumatic.      Nose: Nose normal.   Eyes:      General: Lids are normal. Gaze aligned appropriately.      Conjunctiva/sclera: Conjunctivae normal.      Right eye: Right conjunctiva is not injected.      Left eye: Left conjunctiva is not injected.      Pupils: Pupils are equal, round, and reactive to light.   Cardiovascular:      Rate and Rhythm: Normal rate and regular rhythm.      Pulses: Normal pulses.      Heart sounds: Normal heart sounds, S1 normal and S2 normal. No murmur heard.    No friction rub. No gallop.   Pulmonary:      Effort: Pulmonary effort is normal. No respiratory distress.      Breath sounds: Normal breath sounds. No stridor. No decreased breath sounds, wheezing, rhonchi or rales.   Chest:      Chest wall: No tenderness.   Musculoskeletal:      Cervical back: Neck supple.      Right lower leg: No edema.      Left lower leg: No edema.   Lymphadenopathy:      Cervical: No cervical adenopathy.   Skin:     General: Skin is warm and dry.      Capillary Refill: Capillary refill takes less than 2 seconds.      Findings: No erythema or rash.   Neurological:      Mental Status: He is alert and oriented to person, place, and time. Mental status is at baseline.      GCS: GCS eye subscore is 4. GCS verbal subscore is 5. GCS motor subscore is 6.      Cranial Nerves: Cranial nerves 2-12 are intact.      Sensory: Sensation is intact.      Motor: Motor function is " intact. No weakness, tremor, abnormal muscle tone or pronator drift.      Coordination: Coordination is intact. Romberg sign negative. Finger-Nose-Finger Test and Heel to Shin Test normal.      Gait: Gait is intact.   Psychiatric:         Attention and Perception: Attention normal.         Mood and Affect: Mood normal.         Speech: Speech normal.         Behavior: Behavior normal. Behavior is cooperative.         Thought Content: Thought content normal.         Judgment: Judgment normal.        Assessment:       1. Recurrent syncope    2. Tachycardia    3. History of neurofibromatosis        Plan:       Recurrent syncope  No acute neuro deficits noted.  Heart with regular rate/ rhythm.  This has happened before. Patient reports no apparent cause has been found.  Suspect possible vasovagal syncope from lifting boxes.  He did report tachycardia prior to passing out so will get a Holter monitor.  Labs, EKG and CT of head ordered.  If testing is normal, may need echo. If syncope occurs again patient encouraged to go to the ER.   -     CBC Auto Differential; Future; Expected date: 07/12/2022  -     Comprehensive Metabolic Panel; Future; Expected date: 07/12/2022  -     TSH; Future; Expected date: 07/12/2022  -     Urinalysis; Future; Expected date: 07/12/2022  -     Drug screen panel, emergency  -     EKG 12-lead; Future  -     Holter monitor - 48 hour; Future  -     CT Head Without Contrast; Future; Expected date: 07/12/2022  -     Ambulatory referral/consult to Neurology; Future; Expected date: 07/19/2022    Tachycardia  Regular rate and rhythm today.  Holter monitor was ordered, but suspect it will be normal unless another syncopal episode happens to occur in the 48 hours he is wearing the monitor.   -     TSH; Future; Expected date: 07/12/2022  -     Drug screen panel, emergency  -     EKG 12-lead; Future  -     Holter monitor - 48 hour; Future    History of neurofibromatosis  Noted as an infant.  CT scan in 2019  was normal.  No neurological deficits noted on exam.   -     CT Head Without Contrast; Future; Expected date: 07/12/2022  -     Ambulatory referral/consult to Neurology; Future; Expected date: 07/19/2022    This note was created using [employer] voice recognition software that occasionally misinterprets phrases or words.     Follow up if symptoms worsen or fail to improve.      7/12/2022 FLAKITA Merrill, FNP

## 2022-07-14 DIAGNOSIS — R55 RECURRENT SYNCOPE: Primary | ICD-10-CM

## 2022-07-14 DIAGNOSIS — R00.0 TACHYCARDIA: ICD-10-CM

## 2022-07-22 DIAGNOSIS — G45.9 TRANSIENT ISCHEMIC ATTACK (TIA): ICD-10-CM

## 2022-07-22 DIAGNOSIS — R55 SYNCOPE AND COLLAPSE: Primary | ICD-10-CM

## 2022-07-22 DIAGNOSIS — R56.9 SEIZURES: ICD-10-CM

## 2022-07-22 DIAGNOSIS — Q85.00: ICD-10-CM

## 2022-07-25 ENCOUNTER — TELEPHONE (OUTPATIENT)
Dept: CARDIOLOGY | Facility: CLINIC | Age: 27
End: 2022-07-25
Payer: MEDICAID

## 2022-07-25 NOTE — TELEPHONE ENCOUNTER
----- Message from Jason Glez sent at 7/25/2022  2:33 PM CDT -----  Contact: urvashi  Type:  Sooner Appointment Request  Caller is requesting a sooner appointment.  Caller declined first available appointment listed below.  Caller will not accept being placed on the waitlist and is requesting a message be sent to doctor.  Name of Caller:  Pt mom Urvashi  When is the first available appointment?  consult  Symptoms:  history of heart events   Best Call Back Number:  [phone]  Additional Information:  Pt mom requesting a call back concerning scheduling appt for son.Mom was told pt needs a referral.Pt has Medicaid and was not allowed to schedule appt.

## 2022-07-25 NOTE — TELEPHONE ENCOUNTER
Advised patient mom we do not take medicaid and that Dr. Ham doesn't take new patients. She advised she wanted Dr. Weinberg. Did advise he is no longer in our office only in [place] that she would have to call them and see when available is. Pt mom said she would call over there to determine.

## 2022-07-25 NOTE — TELEPHONE ENCOUNTER
----- Message from Sam Billy sent at 7/25/2022  1:16 PM CDT -----  Type:  Sooner Appointment Request    Caller is requesting a sooner appointment.  Caller declined first available appointment listed below.  Caller will not accept being placed on the waitlist and is requesting a message be sent to doctor.    Name of Caller:  Pts mother  When is the first available appointment?    Symptoms:  Referral from Dr Maxi Chadwick Call Back Number:  [phone]     Additional Information:  St[facility] office was sent the referral.  Please advise -- Thank you

## 2022-07-25 NOTE — TELEPHONE ENCOUNTER
Informed pt mother informed her that we do not have medicaid appointments  available at the time . CCC shannon santos was given . understanding was verbalized

## 2022-08-10 ENCOUNTER — CLINICAL SUPPORT (OUTPATIENT)
Dept: CARDIOLOGY | Facility: HOSPITAL | Age: 27
End: 2022-08-10
Attending: NURSE PRACTITIONER
Payer: MEDICAID

## 2022-08-10 VITALS — WEIGHT: 147 LBS | BODY MASS INDEX: 21.77 KG/M2 | HEIGHT: 69 IN

## 2022-08-10 DIAGNOSIS — R55 SYNCOPE AND COLLAPSE: ICD-10-CM

## 2022-08-10 DIAGNOSIS — R00.0 TACHYCARDIA: ICD-10-CM

## 2022-08-10 DIAGNOSIS — G45.9 TRANSIENT ISCHEMIC ATTACK (TIA): ICD-10-CM

## 2022-08-10 DIAGNOSIS — R55 RECURRENT SYNCOPE: ICD-10-CM

## 2022-08-10 LAB
AORTIC ROOT ANNULUS: 2.63 CM
AV INDEX (PROSTH): 0.98
AV MEAN GRADIENT: 2 MMHG
BSA FOR ECHO PROCEDURE: 1.8 M2
CV ECHO LV RWT: 0.49 CM
DOP CALC AO VTI: 17.61 CM
DOP CALC LVOT PEAK VEL: 90.44 M/S
DOP CALCLVOT PEAK VEL VTI: 17.21 CM
E WAVE DECELERATION TIME: 209.96 MSEC
E/A RATIO: 0.93
E/E' RATIO: 4.08 M/S
ECHO LV POSTERIOR WALL: 0.88 CM (ref 0.6–1.1)
EJECTION FRACTION: 65 %
FRACTIONAL SHORTENING: 32 % (ref 28–44)
INTERVENTRICULAR SEPTUM: 0.87 CM (ref 0.6–1.1)
LEFT ATRIUM SIZE: 2.4 CM
LEFT INTERNAL DIMENSION IN SYSTOLE: 2.42 CM (ref 2.1–4)
LEFT VENTRICLE DIASTOLIC VOLUME INDEX: 25.33 ML/M2
LEFT VENTRICLE DIASTOLIC VOLUME: 45.84 ML
LEFT VENTRICLE MASS INDEX: 49 G/M2
LEFT VENTRICLE SYSTOLIC VOLUME INDEX: 7.8 ML/M2
LEFT VENTRICLE SYSTOLIC VOLUME: 14.11 ML
LEFT VENTRICULAR INTERNAL DIMENSION IN DIASTOLE: 3.58 CM (ref 3.5–6)
LEFT VENTRICULAR MASS: 88.41 G
LV LATERAL E/E' RATIO: 3.19 M/S
LV SEPTAL E/E' RATIO: 5.67 M/S
MV PEAK A VEL: 0.55 M/S
MV PEAK E VEL: 0.51 M/S
PISA TR MAX VEL: 2.14 M/S
RA PRESSURE: 3 MMHG
RIGHT VENTRICULAR END-DIASTOLIC DIMENSION: 2.01 CM
TDI LATERAL: 0.16 M/S
TDI SEPTAL: 0.09 M/S
TDI: 0.13 M/S
TR MAX PG: 18 MMHG
TV REST PULMONARY ARTERY PRESSURE: 21 MMHG

## 2022-08-10 PROCEDURE — 93227 XTRNL ECG REC<48 HR R&I: CPT | Mod: ,,, | Performed by: INTERNAL MEDICINE

## 2022-08-10 PROCEDURE — 93306 ECHO (CUPID ONLY): ICD-10-PCS | Mod: 26,,, | Performed by: GENERAL PRACTICE

## 2022-08-10 PROCEDURE — 93227 HOLTER MONITOR - 48 HOUR (CUPID ONLY): ICD-10-PCS | Mod: ,,, | Performed by: INTERNAL MEDICINE

## 2022-08-10 PROCEDURE — 93306 TTE W/DOPPLER COMPLETE: CPT

## 2022-08-10 PROCEDURE — 93226 XTRNL ECG REC<48 HR SCAN A/R: CPT

## 2022-08-10 PROCEDURE — 93306 TTE W/DOPPLER COMPLETE: CPT | Mod: 26,,, | Performed by: GENERAL PRACTICE

## 2022-08-16 ENCOUNTER — HOSPITAL ENCOUNTER (OUTPATIENT)
Dept: RADIOLOGY | Facility: HOSPITAL | Age: 27
Discharge: HOME | End: 2022-08-16
Attending: NURSE PRACTITIONER
Payer: MEDICAID

## 2022-08-16 DIAGNOSIS — G45.9 TRANSIENT ISCHEMIC ATTACK (TIA): ICD-10-CM

## 2022-08-16 DIAGNOSIS — R55 SYNCOPE AND COLLAPSE: ICD-10-CM

## 2022-08-16 DIAGNOSIS — R56.9 SEIZURES: ICD-10-CM

## 2022-08-16 DIAGNOSIS — Q85.00: ICD-10-CM

## 2022-08-16 PROCEDURE — 70544 MR ANGIOGRAPHY HEAD W/O DYE: CPT | Mod: TC,PO,59

## 2022-08-16 PROCEDURE — 70551 MRI BRAIN STEM W/O DYE: CPT | Mod: TC,PO

## 2022-08-16 PROCEDURE — 93880 EXTRACRANIAL BILAT STUDY: CPT | Mod: TC,PO

## 2022-08-23 LAB
OHS CV EVENT MONITOR DAY: 2
OHS CV HOLTER LENGTH DECIMAL HOURS: 96
OHS CV HOLTER LENGTH HOURS: 48
OHS CV HOLTER LENGTH MINUTES: 0
OHS CV HOLTER SINUS AVERAGE HR: 77
OHS CV HOLTER SINUS MAX HR: 125
OHS CV HOLTER SINUS MIN HR: 43

## 2022-08-24 ENCOUNTER — TELEPHONE (OUTPATIENT)
Dept: FAMILY MEDICINE | Facility: CLINIC | Age: 27
End: 2022-08-24

## 2022-08-24 NOTE — TELEPHONE ENCOUNTER
----- Message from Negrito Jones NP sent at 8/24/2022  9:16 AM CDT -----  Holter monitor was stable.  No significant arrhythmias noted.

## 2022-10-20 ENCOUNTER — OFFICE VISIT (OUTPATIENT)
Dept: FAMILY MEDICINE | Facility: CLINIC | Age: 27
End: 2022-10-20
Payer: MEDICAID

## 2022-10-20 VITALS
SYSTOLIC BLOOD PRESSURE: 112 MMHG | HEART RATE: 80 BPM | OXYGEN SATURATION: 97 % | RESPIRATION RATE: 18 BRPM | WEIGHT: 151 LBS | BODY MASS INDEX: 22.36 KG/M2 | TEMPERATURE: 98 F | DIASTOLIC BLOOD PRESSURE: 68 MMHG | HEIGHT: 69 IN

## 2022-10-20 DIAGNOSIS — L03.011 PARONYCHIA OF RIGHT THUMB: Primary | ICD-10-CM

## 2022-10-20 DIAGNOSIS — Z00.00 PREVENTATIVE HEALTH CARE: ICD-10-CM

## 2022-10-20 PROCEDURE — 3078F DIAST BP <80 MM HG: CPT | Mod: CPTII,,, | Performed by: FAMILY MEDICINE

## 2022-10-20 PROCEDURE — 3074F PR MOST RECENT SYSTOLIC BLOOD PRESSURE < 130 MM HG: ICD-10-PCS | Mod: CPTII,,, | Performed by: FAMILY MEDICINE

## 2022-10-20 PROCEDURE — 3078F PR MOST RECENT DIASTOLIC BLOOD PRESSURE < 80 MM HG: ICD-10-PCS | Mod: CPTII,,, | Performed by: FAMILY MEDICINE

## 2022-10-20 PROCEDURE — 90715 TDAP VACCINE 7 YRS/> IM: CPT | Mod: PBBFAC | Performed by: FAMILY MEDICINE

## 2022-10-20 PROCEDURE — 3074F SYST BP LT 130 MM HG: CPT | Mod: CPTII,,, | Performed by: FAMILY MEDICINE

## 2022-10-20 PROCEDURE — 1159F PR MEDICATION LIST DOCUMENTED IN MEDICAL RECORD: ICD-10-PCS | Mod: CPTII,,, | Performed by: FAMILY MEDICINE

## 2022-10-20 PROCEDURE — 1159F MED LIST DOCD IN RCRD: CPT | Mod: CPTII,,, | Performed by: FAMILY MEDICINE

## 2022-10-20 PROCEDURE — 99213 PR OFFICE/OUTPT VISIT, EST, LEVL III, 20-29 MIN: ICD-10-PCS | Mod: S$PBB,,, | Performed by: FAMILY MEDICINE

## 2022-10-20 PROCEDURE — 99214 OFFICE O/P EST MOD 30 MIN: CPT | Performed by: FAMILY MEDICINE

## 2022-10-20 PROCEDURE — 99213 OFFICE O/P EST LOW 20 MIN: CPT | Mod: S$PBB,,, | Performed by: FAMILY MEDICINE

## 2022-10-20 RX ORDER — DOXYCYCLINE 100 MG/1
100 CAPSULE ORAL 2 TIMES DAILY
Qty: 20 CAPSULE | Refills: 0 | Status: SHIPPED | OUTPATIENT
Start: 2022-10-20 | End: 2022-10-30

## 2022-10-20 NOTE — PROGRESS NOTES
Subjective:       Patient ID: Carlos Ortiz is a 27 y.o. male.    Chief Complaint: thumb pain (Thumb infected)      Patient is here because of cuticle infection started approximately 3 days ago from biting the nails/cuticle.  He has had this previously 2 years ago      Allergies and Medications:   Review of patient's allergies indicates:  No Known Allergies  Current Outpatient Medications   Medication Sig Dispense Refill    doxycycline (VIBRAMYCIN) 100 MG Cap Take 1 capsule (100 mg total) by mouth 2 (two) times daily. for 10 days 20 capsule 0     No current facility-administered medications for this visit.       Family History:   Family History   Problem Relation Age of Onset    Asthma Mother     Heart disease Father        Social History:   Social History     Socioeconomic History    Marital status: Single   Tobacco Use    Smoking status: Never    Smokeless tobacco: Never   Substance and Sexual Activity    Alcohol use: No    Drug use: No       Review of Systems    Objective:     Vitals:    10/20/22 1435   BP: 112/68   Pulse: 80   Resp: 18   Temp: 97.8 °F (36.6 °C)        Physical Exam  Musculoskeletal:        Hands:        Assessment:       1. Paronychia of right thumb -there is no obvious abscess formation at this time and drainage is in not indicated.       Plan:       Carlos was seen today for thumb pain.    Diagnoses and all orders for this visit:    Paronychia of right thumb  -     Tdap Vaccine  -     doxycycline (VIBRAMYCIN) 100 MG Cap; Take 1 capsule (100 mg total) by mouth 2 (two) times daily. for 10 days       No follow-ups on file.

## 2023-06-22 ENCOUNTER — TELEPHONE (OUTPATIENT)
Dept: FAMILY MEDICINE | Facility: CLINIC | Age: 28
End: 2023-06-22

## 2023-08-11 DIAGNOSIS — R55 RECURRENT SYNCOPE: Primary | ICD-10-CM

## 2023-08-11 NOTE — PROGRESS NOTES
Mother called:  Child is been continued to have episodes of syncope and needs a follow-up referral to Dr. Chau apparently his tilt-table test was not completed last visit and he needs to get another referral to see Dr. Chau again will place a referral.

## 2023-12-15 ENCOUNTER — OFFICE VISIT (OUTPATIENT)
Dept: FAMILY MEDICINE | Facility: CLINIC | Age: 28
End: 2023-12-15
Payer: MEDICAID

## 2023-12-15 VITALS
DIASTOLIC BLOOD PRESSURE: 70 MMHG | RESPIRATION RATE: 18 BRPM | HEART RATE: 65 BPM | BODY MASS INDEX: 23.38 KG/M2 | SYSTOLIC BLOOD PRESSURE: 108 MMHG | WEIGHT: 157.88 LBS | TEMPERATURE: 98 F | HEIGHT: 69 IN | OXYGEN SATURATION: 98 %

## 2023-12-15 DIAGNOSIS — H61.22 IMPACTED CERUMEN OF LEFT EAR: Primary | ICD-10-CM

## 2023-12-15 DIAGNOSIS — Z00.00 PREVENTATIVE HEALTH CARE: ICD-10-CM

## 2023-12-15 PROCEDURE — 3078F PR MOST RECENT DIASTOLIC BLOOD PRESSURE < 80 MM HG: ICD-10-PCS | Mod: CPTII,,, | Performed by: NURSE PRACTITIONER

## 2023-12-15 PROCEDURE — 1159F PR MEDICATION LIST DOCUMENTED IN MEDICAL RECORD: ICD-10-PCS | Mod: CPTII,,, | Performed by: NURSE PRACTITIONER

## 2023-12-15 PROCEDURE — 1159F MED LIST DOCD IN RCRD: CPT | Mod: CPTII,,, | Performed by: NURSE PRACTITIONER

## 2023-12-15 PROCEDURE — 99213 PR OFFICE/OUTPT VISIT, EST, LEVL III, 20-29 MIN: ICD-10-PCS | Mod: 25,S$PBB,, | Performed by: NURSE PRACTITIONER

## 2023-12-15 PROCEDURE — 3074F PR MOST RECENT SYSTOLIC BLOOD PRESSURE < 130 MM HG: ICD-10-PCS | Mod: CPTII,,, | Performed by: NURSE PRACTITIONER

## 2023-12-15 PROCEDURE — 3074F SYST BP LT 130 MM HG: CPT | Mod: CPTII,,, | Performed by: NURSE PRACTITIONER

## 2023-12-15 PROCEDURE — 69209 EAR CERUMEN REMOVAL: ICD-10-PCS | Mod: S$PBB,LT,, | Performed by: NURSE PRACTITIONER

## 2023-12-15 PROCEDURE — 99213 OFFICE O/P EST LOW 20 MIN: CPT | Mod: 25,S$PBB,, | Performed by: NURSE PRACTITIONER

## 2023-12-15 PROCEDURE — 1160F RVW MEDS BY RX/DR IN RCRD: CPT | Mod: CPTII,,, | Performed by: NURSE PRACTITIONER

## 2023-12-15 PROCEDURE — 99213 OFFICE O/P EST LOW 20 MIN: CPT | Performed by: NURSE PRACTITIONER

## 2023-12-15 PROCEDURE — 69209 REMOVE IMPACTED EAR WAX UNI: CPT | Mod: PBBFAC,LT | Performed by: NURSE PRACTITIONER

## 2023-12-15 PROCEDURE — 1160F PR REVIEW ALL MEDS BY PRESCRIBER/CLIN PHARMACIST DOCUMENTED: ICD-10-PCS | Mod: CPTII,,, | Performed by: NURSE PRACTITIONER

## 2023-12-15 PROCEDURE — 3008F BODY MASS INDEX DOCD: CPT | Mod: CPTII,,, | Performed by: NURSE PRACTITIONER

## 2023-12-15 PROCEDURE — 3078F DIAST BP <80 MM HG: CPT | Mod: CPTII,,, | Performed by: NURSE PRACTITIONER

## 2023-12-15 PROCEDURE — 3008F PR BODY MASS INDEX (BMI) DOCUMENTED: ICD-10-PCS | Mod: CPTII,,, | Performed by: NURSE PRACTITIONER

## 2023-12-15 NOTE — PROGRESS NOTES
SUBJECTIVE:      Patient ID: Carlos Ortiz is a 28 y.o. male.    Chief Complaint: Ear Fullness     Carlos is a patient of Dr. Bains here for  complains of left ear fullness and decreased hearing over the past several weeks.  Patient states he is prone to excessive ear wax and usually has to get it flushed periodically.   Patient denies ear pain, discharge, recent URI, fever, chills, or adenopathy.  He is due for annual visit and blood work which will be ordered today for his next visit.  Declines recommended flu vaccine.        Family History   Problem Relation Age of Onset    Asthma Mother     Heart disease Father       Social History     Socioeconomic History    Marital status: Single   Tobacco Use    Smoking status: Never    Smokeless tobacco: Never   Substance and Sexual Activity    Alcohol use: No    Drug use: No     Social Determinants of Health     Financial Resource Strain: Low Risk  (4/2/2020)    Overall Financial Resource Strain (CARDIA)     Difficulty of Paying Living Expenses: Not hard at all   Food Insecurity: No Food Insecurity (4/2/2020)    Hunger Vital Sign     Worried About Running Out of Food in the Last Year: Never true     Ran Out of Food in the Last Year: Never true   Transportation Needs: No Transportation Needs (4/2/2020)    PRAPARE - Transportation     Lack of Transportation (Medical): No     Lack of Transportation (Non-Medical): No   Stress: No Stress Concern Present (3/11/2020)    [employer] - Occupational Stress Questionnaire     Feeling of Stress : Not at all   Social Connections: Unknown (4/2/2020)    Social Connection and Isolation Panel [NHANES]     Frequency of Communication with Friends and Family: Never     Frequency of Social Gatherings with Friends and Family: Twice a week     Active Member of Clubs or Organizations: No     Attends Club or Organization Meetings: Never     Marital Status: Never      No current outpatient medications on  "file.     No current facility-administered medications for this visit.     Review of patient's allergies indicates:  No Known Allergies   Past Medical History:   Diagnosis Date    NF (neurofibromatosis) 1997    Otitis media in child      Past Surgical History:   Procedure Laterality Date    FRACTURE ARM  Bilateral        Review of Systems   Constitutional:  Negative for chills and fever.   HENT:  Positive for ear discharge and hearing loss. Negative for congestion, ear pain and sore throat.    Eyes:  Negative for pain, discharge and redness.   Respiratory:  Negative for cough and shortness of breath.    Cardiovascular:  Negative for chest pain.   Gastrointestinal:  Negative for abdominal pain, diarrhea, nausea and vomiting.   Allergic/Immunologic: Negative for environmental allergies.   Neurological:  Negative for dizziness and headaches.   Hematological:  Negative for adenopathy.      OBJECTIVE:      Vitals:    12/15/23 0745   BP: 108/70   BP Location: Left arm   Patient Position: Sitting   BP Method: Medium (Manual)   Pulse: 65   Resp: 18   Temp: 98.2 °F (36.8 °C)   TempSrc: Oral   SpO2: 98%   Weight: 71.6 kg (157 lb 14.4 oz)   Height: 5' 9" (1.753 m)     Physical Exam  Vitals and nursing note reviewed.   Constitutional:       General: He is not in acute distress.     Appearance: Normal appearance. He is normal weight. He is not ill-appearing.   HENT:      Right Ear: Tympanic membrane, ear canal and external ear normal. There is no impacted cerumen.      Left Ear: There is impacted cerumen.      Nose: Nose normal.      Mouth/Throat:      Mouth: Mucous membranes are moist.      Pharynx: Oropharynx is clear. No oropharyngeal exudate or posterior oropharyngeal erythema.   Eyes:      Conjunctiva/sclera: Conjunctivae normal.      Pupils: Pupils are equal, round, and reactive to light.   Neck:      Thyroid: No thyroid mass, thyromegaly or thyroid tenderness.   Cardiovascular:      Rate and Rhythm: Normal rate and " regular rhythm.      Heart sounds: Normal heart sounds.   Pulmonary:      Effort: Pulmonary effort is normal. No respiratory distress.      Breath sounds: Normal breath sounds. No wheezing, rhonchi or rales.   Musculoskeletal:      Cervical back: Normal range of motion and neck supple.   Lymphadenopathy:      Cervical: No cervical adenopathy.   Skin:     General: Skin is warm and dry.   Neurological:      Mental Status: He is alert and oriented to person, place, and time.   Psychiatric:         Mood and Affect: Mood normal.         Behavior: Behavior normal.         Thought Content: Thought content normal.        Assessment:       1. Impacted cerumen of left ear    2. Preventative health care        Plan:       Impacted cerumen of left ear  -     Ear Cerumen Removal  - cerumen fully removed by LPN, pt feeling complete relief and TM normal on exam    Preventative health care  -     CBC Auto Differential; Future; Expected date: 12/15/2023  -     Comprehensive Metabolic Panel; Future; Expected date: 12/15/2023  -     Lipid Panel; Future; Expected date: 12/15/2023        Follow up in about 1 month (around 1/15/2024) for annual, f/u labs .      12/15/2023 Nereida Jones, FLAKITA, FNP    This note was created using [employer] voice recognition software that occasionally misinterprets phrases or words.

## 2023-12-15 NOTE — PROCEDURES
Ear Cerumen Removal    Date/Time: 12/15/2023 7:40 AM    Performed by: Nereida Jones FNP  Authorized by: Nereida Jones FNP    Consent Done?:  Yes (Written)  Location details:  Left ear  Procedure type: irrigation    Cerumen  Removal Results:  Cerumen completely removed  Patient tolerance:  Patient tolerated the procedure well with no immediate complications

## 2024-01-17 ENCOUNTER — TELEPHONE (OUTPATIENT)
Dept: FAMILY MEDICINE | Facility: CLINIC | Age: 29
End: 2024-01-17
Payer: MEDICAID

## 2024-01-19 ENCOUNTER — OFFICE VISIT (OUTPATIENT)
Dept: FAMILY MEDICINE | Facility: CLINIC | Age: 29
End: 2024-01-19
Payer: MEDICAID

## 2024-01-19 VITALS
HEART RATE: 88 BPM | TEMPERATURE: 99 F | RESPIRATION RATE: 18 BRPM | WEIGHT: 160 LBS | OXYGEN SATURATION: 97 % | BODY MASS INDEX: 23.7 KG/M2 | SYSTOLIC BLOOD PRESSURE: 118 MMHG | DIASTOLIC BLOOD PRESSURE: 70 MMHG | HEIGHT: 69 IN

## 2024-01-19 DIAGNOSIS — R55 RECURRENT SYNCOPE: ICD-10-CM

## 2024-01-19 DIAGNOSIS — Z00.00 PREVENTATIVE HEALTH CARE: Primary | ICD-10-CM

## 2024-01-19 DIAGNOSIS — Q85.00 NF (NEUROFIBROMATOSIS): ICD-10-CM

## 2024-01-19 PROCEDURE — 1159F MED LIST DOCD IN RCRD: CPT | Mod: CPTII,,, | Performed by: FAMILY MEDICINE

## 2024-01-19 PROCEDURE — 99395 PREV VISIT EST AGE 18-39: CPT | Mod: S$PBB,,, | Performed by: FAMILY MEDICINE

## 2024-01-19 PROCEDURE — 99213 OFFICE O/P EST LOW 20 MIN: CPT | Mod: PBBFAC,PN | Performed by: FAMILY MEDICINE

## 2024-01-19 PROCEDURE — 99999PBSHW FLU VACCINE (QUAD) GREATER THAN OR EQUAL TO 3YO PRESERVATIVE FREE IM: Mod: PBBFAC,,,

## 2024-01-19 PROCEDURE — 3074F SYST BP LT 130 MM HG: CPT | Mod: CPTII,,, | Performed by: FAMILY MEDICINE

## 2024-01-19 PROCEDURE — 3008F BODY MASS INDEX DOCD: CPT | Mod: CPTII,,, | Performed by: FAMILY MEDICINE

## 2024-01-19 PROCEDURE — 90686 IIV4 VACC NO PRSV 0.5 ML IM: CPT | Mod: PBBFAC,PN

## 2024-01-19 PROCEDURE — 3078F DIAST BP <80 MM HG: CPT | Mod: CPTII,,, | Performed by: FAMILY MEDICINE

## 2024-01-19 PROCEDURE — 99999 PR PBB SHADOW E&M-EST. PATIENT-LVL III: CPT | Mod: PBBFAC,,, | Performed by: FAMILY MEDICINE

## 2024-01-26 ENCOUNTER — LAB VISIT (OUTPATIENT)
Dept: LAB | Facility: HOSPITAL | Age: 29
End: 2024-01-26
Attending: FAMILY MEDICINE
Payer: MEDICAID

## 2024-01-26 DIAGNOSIS — Z00.00 PREVENTATIVE HEALTH CARE: ICD-10-CM

## 2024-01-26 LAB
ALBUMIN SERPL BCP-MCNC: 4.2 G/DL (ref 3.5–5.2)
ALP SERPL-CCNC: 67 U/L (ref 55–135)
ALT SERPL W/O P-5'-P-CCNC: 27 U/L (ref 10–44)
ANION GAP SERPL CALC-SCNC: 6 MMOL/L (ref 8–16)
AST SERPL-CCNC: 20 U/L (ref 10–40)
BASOPHILS # BLD AUTO: 0.05 K/UL (ref 0–0.2)
BASOPHILS NFR BLD: 0.6 % (ref 0–1.9)
BILIRUB SERPL-MCNC: 0.5 MG/DL (ref 0.1–1)
BUN SERPL-MCNC: 13 MG/DL (ref 6–20)
CALCIUM SERPL-MCNC: 9.2 MG/DL (ref 8.7–10.5)
CHLORIDE SERPL-SCNC: 105 MMOL/L (ref 95–110)
CHOLEST SERPL-MCNC: 141 MG/DL (ref 120–199)
CHOLEST/HDLC SERPL: 3.6 {RATIO} (ref 2–5)
CO2 SERPL-SCNC: 27 MMOL/L (ref 23–29)
CREAT SERPL-MCNC: 0.8 MG/DL (ref 0.5–1.4)
DIFFERENTIAL METHOD BLD: ABNORMAL
EOSINOPHIL # BLD AUTO: 0.2 K/UL (ref 0–0.5)
EOSINOPHIL NFR BLD: 1.7 % (ref 0–8)
ERYTHROCYTE [DISTWIDTH] IN BLOOD BY AUTOMATED COUNT: 13.4 % (ref 11.5–14.5)
EST. GFR  (NO RACE VARIABLE): >60 ML/MIN/1.73 M^2
GLUCOSE SERPL-MCNC: 86 MG/DL (ref 70–110)
HCT VFR BLD AUTO: 46.1 % (ref 40–54)
HDLC SERPL-MCNC: 39 MG/DL (ref 40–75)
HDLC SERPL: 27.7 % (ref 20–50)
HGB BLD-MCNC: 15.2 G/DL (ref 14–18)
IMM GRANULOCYTES # BLD AUTO: 0.03 K/UL (ref 0–0.04)
IMM GRANULOCYTES NFR BLD AUTO: 0.3 % (ref 0–0.5)
LDLC SERPL CALC-MCNC: 84.8 MG/DL (ref 63–159)
LYMPHOCYTES # BLD AUTO: 3.6 K/UL (ref 1–4.8)
LYMPHOCYTES NFR BLD: 40.2 % (ref 18–48)
MCH RBC QN AUTO: 26.8 PG (ref 27–31)
MCHC RBC AUTO-ENTMCNC: 33 G/DL (ref 32–36)
MCV RBC AUTO: 81 FL (ref 82–98)
MONOCYTES # BLD AUTO: 0.8 K/UL (ref 0.3–1)
MONOCYTES NFR BLD: 9.3 % (ref 4–15)
NEUTROPHILS # BLD AUTO: 4.3 K/UL (ref 1.8–7.7)
NEUTROPHILS NFR BLD: 47.9 % (ref 38–73)
NONHDLC SERPL-MCNC: 102 MG/DL
NRBC BLD-RTO: 0 /100 WBC
PLATELET # BLD AUTO: 261 K/UL (ref 150–450)
PMV BLD AUTO: 10.6 FL (ref 9.2–12.9)
POTASSIUM SERPL-SCNC: 3.8 MMOL/L (ref 3.5–5.1)
PROT SERPL-MCNC: 6.8 G/DL (ref 6–8.4)
RBC # BLD AUTO: 5.67 M/UL (ref 4.6–6.2)
SODIUM SERPL-SCNC: 138 MMOL/L (ref 136–145)
TRIGL SERPL-MCNC: 86 MG/DL (ref 30–150)
WBC # BLD AUTO: 8.86 K/UL (ref 3.9–12.7)

## 2024-01-26 PROCEDURE — 80053 COMPREHEN METABOLIC PANEL: CPT | Performed by: FAMILY MEDICINE

## 2024-01-26 PROCEDURE — 80061 LIPID PANEL: CPT | Performed by: FAMILY MEDICINE

## 2024-01-26 PROCEDURE — 86803 HEPATITIS C AB TEST: CPT | Performed by: FAMILY MEDICINE

## 2024-01-26 PROCEDURE — 36415 COLL VENOUS BLD VENIPUNCTURE: CPT | Performed by: FAMILY MEDICINE

## 2024-01-26 PROCEDURE — 87389 HIV-1 AG W/HIV-1&-2 AB AG IA: CPT | Performed by: FAMILY MEDICINE

## 2024-01-26 PROCEDURE — 85025 COMPLETE CBC W/AUTO DIFF WBC: CPT | Performed by: FAMILY MEDICINE

## 2024-01-28 LAB
HCV AB S/CO SERPL IA: NON REACTIVE
HIV 1+2 AB+HIV1 P24 AG SERPL QL IA: NON REACTIVE

## 2024-09-04 ENCOUNTER — TELEPHONE (OUTPATIENT)
Dept: FAMILY MEDICINE | Facility: CLINIC | Age: 29
End: 2024-09-04
Payer: MEDICAID

## 2024-09-05 ENCOUNTER — TELEPHONE (OUTPATIENT)
Dept: FAMILY MEDICINE | Facility: CLINIC | Age: 29
End: 2024-09-05
Payer: MEDICAID

## 2024-09-06 ENCOUNTER — TELEPHONE (OUTPATIENT)
Dept: FAMILY MEDICINE | Facility: CLINIC | Age: 29
End: 2024-09-06
Payer: MEDICAID

## 2024-09-09 ENCOUNTER — OFFICE VISIT (OUTPATIENT)
Dept: FAMILY MEDICINE | Facility: CLINIC | Age: 29
End: 2024-09-09
Payer: MEDICAID

## 2024-09-09 VITALS
HEART RATE: 62 BPM | TEMPERATURE: 98 F | DIASTOLIC BLOOD PRESSURE: 80 MMHG | BODY MASS INDEX: 24.04 KG/M2 | SYSTOLIC BLOOD PRESSURE: 118 MMHG | OXYGEN SATURATION: 98 % | WEIGHT: 162.81 LBS

## 2024-09-09 DIAGNOSIS — L60.0 INGROWN TOENAIL: Primary | ICD-10-CM

## 2024-09-09 PROCEDURE — 3079F DIAST BP 80-89 MM HG: CPT | Mod: CPTII,,, | Performed by: FAMILY MEDICINE

## 2024-09-09 PROCEDURE — 3008F BODY MASS INDEX DOCD: CPT | Mod: CPTII,,, | Performed by: FAMILY MEDICINE

## 2024-09-09 PROCEDURE — 1159F MED LIST DOCD IN RCRD: CPT | Mod: CPTII,,, | Performed by: FAMILY MEDICINE

## 2024-09-09 PROCEDURE — 99213 OFFICE O/P EST LOW 20 MIN: CPT | Mod: S$PBB,,, | Performed by: FAMILY MEDICINE

## 2024-09-09 PROCEDURE — 99999 PR PBB SHADOW E&M-EST. PATIENT-LVL III: CPT | Mod: PBBFAC,,, | Performed by: FAMILY MEDICINE

## 2024-09-09 PROCEDURE — 3074F SYST BP LT 130 MM HG: CPT | Mod: CPTII,,, | Performed by: FAMILY MEDICINE

## 2024-09-09 PROCEDURE — 99213 OFFICE O/P EST LOW 20 MIN: CPT | Mod: PBBFAC,PN | Performed by: FAMILY MEDICINE

## 2024-09-09 NOTE — PROGRESS NOTES
Subjective:       Patient ID: Carlos Ortiz is a 29 y.o. male.    Chief Complaint: Toe Pain (Big toe on left side)      Patient is here today because of an ingrown toenail he does not recall whether this is the 1st time it has happened on that toe.  It has been bothering him for 2 weeks pain.     Toe Pain   The incident occurred more than 1 week ago (Two weeks). Incident location: Ingrown toenail great toe left foot. There was no injury mechanism. The pain is present in the left toes. The quality of the pain is described as cramping and burning. The pain is at a severity of 4/10. The pain has been Fluctuating since onset. Pertinent negatives include no inability to bear weight, loss of motion or loss of sensation.       Allergies and Medications:   Review of patient's allergies indicates:  No Known Allergies  No current outpatient medications on file.     No current facility-administered medications for this visit.       Family History:   Family History   Problem Relation Name Age of Onset    Asthma Mother      Heart disease Father         Social History:   Social History     Socioeconomic History    Marital status: Single   Tobacco Use    Smoking status: Never    Smokeless tobacco: Never   Substance and Sexual Activity    Alcohol use: No    Drug use: No     Social Determinants of Health     Financial Resource Strain: Low Risk  (4/2/2020)    Overall Financial Resource Strain (CARDIA)     Difficulty of Paying Living Expenses: Not hard at all   Food Insecurity: No Food Insecurity (4/2/2020)    Hunger Vital Sign     Worried About Running Out of Food in the Last Year: Never true     Ran Out of Food in the Last Year: Never true   Transportation Needs: No Transportation Needs (4/2/2020)    PRAPARE - Transportation     Lack of Transportation (Medical): No     Lack of Transportation (Non-Medical): No   Stress: No Stress Concern Present (3/11/2020)    [employer] - Occupational Stress  Questionnaire     Feeling of Stress : Not at all       Review of Systems    Objective:     Vitals:    09/09/24 1550   BP: 118/80   Pulse: 62   Temp: 98.4 °F (36.9 °C)        Physical Exam  Musculoskeletal:        Feet:          Assessment:       1. Ingrown toenail        Plan:       Carlos was seen today for toe pain.    Diagnoses and all orders for this visit:    Ingrown toenail  -     Ambulatory referral/consult to Podiatry; Future         No follow-ups on file.

## 2024-09-13 ENCOUNTER — TELEPHONE (OUTPATIENT)
Dept: PODIATRY | Facility: CLINIC | Age: 29
End: 2024-09-13
Payer: MEDICAID

## 2024-09-13 NOTE — TELEPHONE ENCOUNTER
----- Message from Veto Schultz sent at 9/13/2024 11:06 AM CDT -----  Contact: Urvashi  Type:  Sooner Appointment Request    Caller is requesting a sooner appointment.  Caller declined first available appointment listed below.  Caller will not accept being placed on the waitlist and is requesting a message be sent to doctor.    Name of Caller:  Urvashi/Mother  When is the first available appointment?  NA  Symptoms:   L60.0 (ICD-10-CM) - Ingrown toenail/ Red and has drainage  Would the patient rather a call back or a response via [employer]? Call Back  Best Call Back Number:  [phone]  Additional Information:  Patient has a referral in the system to see Dr Toledo

## 2024-09-18 ENCOUNTER — OFFICE VISIT (OUTPATIENT)
Dept: PODIATRY | Facility: CLINIC | Age: 29
End: 2024-09-18
Payer: MEDICAID

## 2024-09-18 VITALS — WEIGHT: 164 LBS | HEIGHT: 69 IN | BODY MASS INDEX: 24.29 KG/M2

## 2024-09-18 DIAGNOSIS — L60.0 ONYCHOCRYPTOSIS: ICD-10-CM

## 2024-09-18 DIAGNOSIS — L60.0 INGROWN NAIL: ICD-10-CM

## 2024-09-18 DIAGNOSIS — M79.672 LEFT FOOT PAIN: ICD-10-CM

## 2024-09-18 DIAGNOSIS — M79.675 GREAT TOE PAIN, LEFT: Primary | ICD-10-CM

## 2024-09-18 PROCEDURE — 11750 EXCISION NAIL&NAIL MATRIX: CPT | Mod: PBBFAC,PN

## 2024-09-18 PROCEDURE — 99999 PR PBB SHADOW E&M-EST. PATIENT-LVL II: CPT | Mod: PBBFAC,,,

## 2024-09-18 PROCEDURE — 99212 OFFICE O/P EST SF 10 MIN: CPT | Mod: PBBFAC,PN

## 2024-09-18 NOTE — PROGRESS NOTES
"  [address] [facility] [address] [place] [address]  Phone: [phone]   [phone]    Patient's PCP:Lamont Bains MD  Referring Provider: Dr. Lamont Bains    Subjective:      Chief Complaint:: Ingrown Toenail (LT 1st, medial)    Ingrown Toenail  Pertinent negatives include no abdominal pain, arthralgias, chest pain, chills, coughing, fatigue, fever, headaches, joint swelling, myalgias, nausea, neck pain, numbness, rash or weakness.     Carlos Otriz is a 29 y.o. male who presents today with a complaint of ingrown toenail LT 1st, medial border. The current episode started 2-3 weeks. The symptoms include aching pain. Probable cause of complaint unknown. The symptoms are aggravated by touch. The problem has stayed the same. Treatment to date have included none which provided no relief.     Vitals:    09/18/24 1501   Weight: 74.4 kg (164 lb 0.4 oz)   Height: 5' 9" (1.753 m)   PainSc:   1      Shoe Size: 9    Past Surgical History:   Procedure Laterality Date    FRACTURE ARM  Bilateral      Past Medical History:   Diagnosis Date    NF (neurofibromatosis) 1997    Otitis media in child      Family History   Problem Relation Name Age of Onset    Asthma Mother      Heart disease Father          Social History:   Marital Status: Single  Alcohol History:  reports no history of alcohol use.  Tobacco History:  reports that he has never smoked. He has never used smokeless tobacco.  Drug History:  reports no history of drug use.    Review of patient's allergies indicates:  No Known Allergies    No current outpatient medications on file.     No current facility-administered medications for this visit.       Review of Systems   Constitutional:  Negative for chills, fatigue, fever and unexpected weight change.   HENT:  Negative for hearing loss and trouble swallowing.    Eyes:  Negative for photophobia and visual disturbance.   Respiratory:  Negative for cough, shortness of breath and wheezing.  "   Cardiovascular:  Negative for chest pain, palpitations and leg swelling.   Gastrointestinal:  Negative for abdominal pain and nausea.   Genitourinary:  Negative for dysuria and frequency.   Musculoskeletal:  Negative for arthralgias, back pain, gait problem, joint swelling, myalgias and neck pain.   Skin:  Negative for rash and wound.   Neurological:  Negative for tremors, seizures, weakness, numbness and headaches.   Hematological:  Does not bruise/bleed easily.   Psychiatric/Behavioral:  Negative for hallucinations.          Objective:        Physical Exam:   Foot Exam    General  General Appearance: appears stated age and healthy   Orientation: alert and oriented to person, place, and time   Affect: appropriate   Gait: antalgic       Right Foot/Ankle     Inspection and Palpation  Ecchymosis: none  Tenderness: none   Swelling: none   Skin Exam: skin intact;   Neurovascular  Dorsalis pedis: 2+  Posterior tibial: 2+  Capillary Refill: 3+  Saphenous nerve sensation: normal  Tibial nerve sensation: normal  Superficial peroneal nerve sensation: normal  Deep peroneal nerve sensation: normal  Sural nerve sensation: normal    Muscle Strength  Ankle dorsiflexion: 5  Ankle plantar flexion: 5  Ankle inversion: 5  Ankle eversion: 5  Great toe extension: 5  Great toe flexion: 5    Tests  PT Tinel's sign: negative    Valleix sign: negative  Comments        Left Foot/Ankle      Inspection and Palpation  Ecchymosis: none  Tenderness: (Great toe)  Swelling: (Great toe)  Skin Exam: ulcer and erythema; no drainage and skin not intact   Neurovascular  Dorsalis pedis: 2+  Posterior tibial: 2+  Capillary refill: 3+  Saphenous nerve sensation: normal  Tibial nerve sensation: normal  Superficial peroneal nerve sensation: normal  Deep peroneal nerve sensation: normal  Sural nerve sensation: normal    Muscle Strength  Ankle dorsiflexion: 5  Ankle plantar flexion: 5  Ankle inversion: 5  Ankle eversion: 5  Great toe extension: 5  Great toe  flexion: 5    Tests  PT Tinel's sign: negative  Valleix sign: negative  Comments  Left great toe:   Onychocryptotic Medial nail border  Tenderness with palpation to the great toe Medial skin folds of the nail plate with associated erythema and edema   Scant amount of purulent at the Medial nail border    Physical Exam  Cardiovascular:      Pulses:           Dorsalis pedis pulses are 2+ on the right side and 2+ on the left side.        Posterior tibial pulses are 2+ on the right side and 2+ on the left side.   Feet:      Left foot:      Skin integrity: Ulcer and erythema present.             Right Ankle/Foot Exam     Comments:        Left Ankle/Foot Exam     Muscle Strength   The patient has normal left ankle strength.    Comments:  Left great toe:   Onychocryptotic Medial nail border  Tenderness with palpation to the great toe Medial skin folds of the nail plate with associated erythema and edema   Scant amount of purulent at the Medial nail border        Muscle Strength   Right Lower Extremity   Ankle Dorsiflexion:  5   Plantar flexion:  5/5  Left Lower Extremity   Ankle Dorsiflexion:  5   Plantar flexion:  5/5     Vascular Exam     Right Pulses  Dorsalis Pedis:      2+  Posterior Tibial:      2+        Left Pulses  Dorsalis Pedis:      2+  Posterior Tibial:      2+            Assessment:       1. Great toe pain, left    2. Onychocryptosis    3. Ingrown nail    4. Left foot pain      Plan:   Great toe pain, left    Onychocryptosis    Ingrown nail    Left foot pain    I provided patient education verbally regarding: Patient diagnosis, treatment options, as well as alternatives, risks, and benefits.     We discussed treatment options: no treatment, avulsion of nail border under local with regrowth of nail, chemical matrixectomy for attempted permanent correction of the problem. Discussed 5-10% recurrence and or presence of nail spicule.     Post procedure protocol reviewed with patient, including daily soaks,  medications, and dressing changes following removal of the nail. Discussed infection pre-cautions and instructions to reach out to my staff immediately, local urgent care, or ED if concerns arise.     Obtained written consent for permanent removal of left great toe nail plate medial border(s). See procedure brief below.     Patient may return to activity and standard shoe gear as tolerated.      This note was created using Dragon voice recognition software that occasionally misinterpreted phrases or words.     Follow up in about 2 weeks (around 10/2/2024).    Nail Removal    Date/Time: 9/18/2024 3:20 PM    Performed by: Mike Khan DPM  Authorized by: Mike Khan DPM    Consent Done?:  Yes (Written)  Time out: Immediately prior to the procedure a time out was called    Prep: patient was prepped and draped in usual sterile fashion    Location:     Location:  Left foot    Location detail:  Left big toe  Anesthesia:     Anesthesia:  Digital block    Local anesthetic:  Lidocaine 2% without epinephrine    Anesthetic total (ml):  4  Procedure Details:     Preparation:  Skin prepped with Betadine    Amount removed:  Partial    Side:  Medial    Wedge excision of skin of nail fold: No      Nail bed sutured?: No      Nail matrix removed:  Partial    Removal method:  Phenol and alcohol    Dressing applied:  4x4, antibiotic ointment, dressing applied and Xeroform gauze    Patient tolerance:  Patient tolerated the procedure well with no immediate complications      Mike Khan DPM  Podiatry / Foot and Ankle Surgery   Secure chat preferred

## 2025-01-21 ENCOUNTER — TELEPHONE (OUTPATIENT)
Dept: FAMILY MEDICINE | Facility: CLINIC | Age: 30
End: 2025-01-21
Payer: MEDICAID

## 2025-01-21 NOTE — TELEPHONE ENCOUNTER
I called the patient in regards to his appointment that he has scheduled with Dr. Bains, patient needs to reschedule this appointment due to inclement weather.  Sending patient a portal message. No answer left voicemail to return our call. Sending patient portal message